# Patient Record
Sex: FEMALE | Race: WHITE | NOT HISPANIC OR LATINO | Employment: FULL TIME | URBAN - METROPOLITAN AREA
[De-identification: names, ages, dates, MRNs, and addresses within clinical notes are randomized per-mention and may not be internally consistent; named-entity substitution may affect disease eponyms.]

---

## 2023-04-10 ENCOUNTER — APPOINTMENT (OUTPATIENT)
Dept: LAB | Facility: LAB | Age: 63
End: 2023-04-10
Payer: COMMERCIAL

## 2023-04-10 LAB
BASOPHILS (10*3/UL) IN BLOOD BY AUTOMATED COUNT: 0.04 X10E9/L (ref 0–0.1)
BASOPHILS/100 LEUKOCYTES IN BLOOD BY AUTOMATED COUNT: 0.6 % (ref 0–2)
EOSINOPHILS (10*3/UL) IN BLOOD BY AUTOMATED COUNT: 0.16 X10E9/L (ref 0–0.7)
EOSINOPHILS/100 LEUKOCYTES IN BLOOD BY AUTOMATED COUNT: 2.3 % (ref 0–6)
ERYTHROCYTE DISTRIBUTION WIDTH (RATIO) BY AUTOMATED COUNT: 12.3 % (ref 11.5–14.5)
ERYTHROCYTE MEAN CORPUSCULAR HEMOGLOBIN CONCENTRATION (G/DL) BY AUTOMATED: 32.1 G/DL (ref 32–36)
ERYTHROCYTE MEAN CORPUSCULAR VOLUME (FL) BY AUTOMATED COUNT: 87 FL (ref 80–100)
ERYTHROCYTES (10*6/UL) IN BLOOD BY AUTOMATED COUNT: 4.83 X10E12/L (ref 4–5.2)
HEMATOCRIT (%) IN BLOOD BY AUTOMATED COUNT: 42.1 % (ref 36–46)
HEMOGLOBIN (G/DL) IN BLOOD: 13.5 G/DL (ref 12–16)
IMMATURE GRANULOCYTES/100 LEUKOCYTES IN BLOOD BY AUTOMATED COUNT: 0.6 % (ref 0–0.9)
LEUKOCYTES (10*3/UL) IN BLOOD BY AUTOMATED COUNT: 6.8 X10E9/L (ref 4.4–11.3)
LYMPHOCYTES (10*3/UL) IN BLOOD BY AUTOMATED COUNT: 2.41 X10E9/L (ref 1.2–4.8)
LYMPHOCYTES/100 LEUKOCYTES IN BLOOD BY AUTOMATED COUNT: 35.3 % (ref 13–44)
MONOCYTES (10*3/UL) IN BLOOD BY AUTOMATED COUNT: 0.71 X10E9/L (ref 0.1–1)
MONOCYTES/100 LEUKOCYTES IN BLOOD BY AUTOMATED COUNT: 10.4 % (ref 2–10)
NEUTROPHILS (10*3/UL) IN BLOOD BY AUTOMATED COUNT: 3.46 X10E9/L (ref 1.2–7.7)
NEUTROPHILS/100 LEUKOCYTES IN BLOOD BY AUTOMATED COUNT: 50.8 % (ref 40–80)
NRBC (PER 100 WBCS) BY AUTOMATED COUNT: 0 /100 WBC (ref 0–0)
PLATELETS (10*3/UL) IN BLOOD AUTOMATED COUNT: 241 X10E9/L (ref 150–450)

## 2023-04-11 LAB
ALLERGEN ANIMAL: CAT DANDER IGE (KU/L): 0.13 KU/L
ALLERGEN ANIMAL: DOG DANDER IGE (KU/L): 0.13 KU/L
ALLERGEN GRASS: BERMUDA GRASS (CYNODON DACTYLON) IGE (KU/L): 1.29 KU/L
ALLERGEN GRASS: JOHNSON GRASS (SORGHUM HALEPENSE) IGE (KU/L): 0.73 KU/L
ALLERGEN GRASS: MEADOW GRASS, KENTUCKY BLUE (POA PRATENSIS )IGE (KU/L): 2.98 KU/L
ALLERGEN GRASS: TIMOTHY GRASS (PHLEUM PRATENSE) IGE (KU/L): 2.29 KU/L
ALLERGEN INSECT: COCKROACH IGE: <0.1 KU/L
ALLERGEN MICROORGANISM: ALTERNARIA ALTERNATA IGE (KU/L): <0.1 KU/L
ALLERGEN MICROORGANISM: ASPERGILLUS FUMIGATUS IGE (KU/L): <0.1 KU/L
ALLERGEN MICROORGANISM: CLADOSPORIUM HERBARUM IGE (KU/L): <0.1 KU/L
ALLERGEN MICROORGANISM: PENICILLIUM CHRYSOGENUM (P. NOTATUM) IGE (KU/L): <0.1 KU/L
ALLERGEN MITE: DERMATOPHAGOIDES FARINAE (HOUSE DUST MITE) IGE (KU/L): 0.7 KU/L
ALLERGEN MITE: DERMATOPHAGOIDES PTERONYSSINUS (HOUSE DUST MITE) IGE (KU/L): 0.16 KU/L
ALLERGEN TREE: BOX-ELDER (ACER NEGUNDO) IGE (KU/L): <0.1 KU/L
ALLERGEN TREE: COMMON SILVER BIRCH (BETULA VERRUCOSA) IGE (KU/L): <0.1 KU/L
ALLERGEN TREE: COTTONWOOD (POPULUS DELTOIDES) IGE (KU/L): <0.1 KU/L
ALLERGEN TREE: ELM (ULMUS AMERICANA) IGE (KU/L): <0.1 KU/L
ALLERGEN TREE: MAPLE LEAF SYCAMORE, LONDON PLANE IGE (KU/L): <0.1 KU/L
ALLERGEN TREE: MOUNTAIN JUNIPER (JUNIPERUS SABINOIDES) IGE (KU/L): <0.1 KU/L
ALLERGEN TREE: MULBERRY (MORUS ALBA) IGE (KU/L): <0.1 KU/L
ALLERGEN TREE: OAK (QUERCUS ALBA) IGE (KU/L): <0.1 KU/L
ALLERGEN TREE: PECAN, HICKORY (CARYA PECAN) IGE (KU/L): <0.1 KU/L
ALLERGEN TREE: WALNUT IGE: <0.1 KU/L
ALLERGEN TREE: WHITE ASH (FRAXINUS AMERICANA) IGE (KU/L): <0.1 KU/L
ALLERGEN WEED: COMMON PIGWEED (AMARANTHUS RETROFLEXUS) IGE (KU/L): <0.1 KU/L
ALLERGEN WEED: COMMON RAGWEED (AMB. ARTEMISIIFOLIA/A. ELATIOR) IGE (KU/L): 0.54 KU/L
ALLERGEN WEED: GOOSEFOOT, LAMB'S QUARTERS (CHENOPODIUM ALBUM) IGE (KU/L): <0.1 KU/L
ALLERGEN WEED: PLANTAIN (ENGLISH), RIBWORT (PLANTAGO LANCEOLATA) IGE (KU/L): <0.1 KU/L
ALLERGEN WEED: PRICKLY SALTWORT/RUSSIAN THISTLE (SALSOLA KALI) IGE (KU/L): <0.1 KU/L
ALLERGEN WEED: SHEEP SORREL (RUMEX ACETOSELLA) IGE (KU/L): <0.1 KU/L
IMMUNOCAP IGE: 121 KU/L (ref 0–214)
IMMUNOCAP INTERPRETATION: ABNORMAL

## 2023-04-14 LAB
CORTISOL UR FREE PER VOLUME: 6.21 UG/L
CORTISOL, URINE FREE - PER 24H: 7.6 UG/D
CORTISOL, URINE INTERPRETATION: NORMAL
CORTISOL/CREATININE RATIO: 6.54 UG/G CRT
CREATININE 24 HOUR URINE: 1167 MG/D (ref 500–1400)
CREATININE, URINE - PER VOLUME: 95 MG/DL
HOURS COLLECTED (ARUP): 24
TOTAL VOLUME (ARUP): 1228

## 2023-05-30 ENCOUNTER — APPOINTMENT (OUTPATIENT)
Dept: LAB | Facility: LAB | Age: 63
End: 2023-05-30
Payer: COMMERCIAL

## 2023-06-29 LAB
GENETICS TEST NAME-DATA CONVERSION: NORMAL
LAB MOLECULAR CA TECHNICAL NOTES: NORMAL

## 2023-10-10 ENCOUNTER — PHARMACY VISIT (OUTPATIENT)
Dept: PHARMACY | Facility: CLINIC | Age: 63
End: 2023-10-10
Payer: COMMERCIAL

## 2023-10-10 RX ORDER — ALBUTEROL SULFATE 90 UG/1
AEROSOL, METERED RESPIRATORY (INHALATION)
Qty: 18 G | Refills: 0 | OUTPATIENT
Start: 2023-01-04

## 2023-10-10 RX ORDER — FLUTICASONE FUROATE AND VILANTEROL 200; 25 UG/1; UG/1
POWDER RESPIRATORY (INHALATION)
Qty: 60 EACH | Refills: 1 | OUTPATIENT
Start: 2023-03-14

## 2023-10-14 ENCOUNTER — PHARMACY VISIT (OUTPATIENT)
Dept: PHARMACY | Facility: CLINIC | Age: 63
End: 2023-10-14
Payer: COMMERCIAL

## 2023-10-17 ENCOUNTER — PHARMACY VISIT (OUTPATIENT)
Dept: PHARMACY | Facility: CLINIC | Age: 63
End: 2023-10-17
Payer: COMMERCIAL

## 2023-10-17 ENCOUNTER — TELEPHONE (OUTPATIENT)
Dept: INTENSIVE CARE | Facility: HOSPITAL | Age: 63
End: 2023-10-17
Payer: COMMERCIAL

## 2023-10-17 DIAGNOSIS — J45.20 MILD INTERMITTENT ASTHMA, UNSPECIFIED WHETHER COMPLICATED (HHS-HCC): Primary | ICD-10-CM

## 2023-10-17 PROCEDURE — RXMED WILLOW AMBULATORY MEDICATION CHARGE

## 2023-10-17 RX ORDER — MONTELUKAST SODIUM 10 MG/1
10 TABLET ORAL NIGHTLY
Qty: 30 TABLET | Refills: 5 | Status: SHIPPED | OUTPATIENT
Start: 2023-10-17 | End: 2024-04-14

## 2023-10-18 ENCOUNTER — PHARMACY VISIT (OUTPATIENT)
Dept: PHARMACY | Facility: CLINIC | Age: 63
End: 2023-10-18
Payer: COMMERCIAL

## 2023-10-25 ENCOUNTER — HOSPITAL ENCOUNTER (OUTPATIENT)
Dept: RADIOLOGY | Facility: HOSPITAL | Age: 63
Discharge: HOME | End: 2023-10-25
Payer: COMMERCIAL

## 2023-10-25 DIAGNOSIS — Z85.3 PERSONAL HISTORY OF MALIGNANT NEOPLASM OF BREAST: ICD-10-CM

## 2023-10-25 PROCEDURE — 77067 SCR MAMMO BI INCL CAD: CPT

## 2023-10-25 PROCEDURE — 77063 BREAST TOMOSYNTHESIS BI: CPT | Mod: BILATERAL PROCEDURE | Performed by: STUDENT IN AN ORGANIZED HEALTH CARE EDUCATION/TRAINING PROGRAM

## 2023-10-25 PROCEDURE — 77063 BREAST TOMOSYNTHESIS BI: CPT

## 2023-10-25 PROCEDURE — 77067 SCR MAMMO BI INCL CAD: CPT | Mod: BILATERAL PROCEDURE | Performed by: STUDENT IN AN ORGANIZED HEALTH CARE EDUCATION/TRAINING PROGRAM

## 2023-11-06 ENCOUNTER — HOSPITAL ENCOUNTER (OUTPATIENT)
Dept: RADIOLOGY | Facility: HOSPITAL | Age: 63
Discharge: HOME | End: 2023-11-06
Payer: COMMERCIAL

## 2023-11-06 DIAGNOSIS — C7A.8 OTHER MALIGNANT NEUROENDOCRINE TUMORS (MULTI): ICD-10-CM

## 2023-11-06 PROCEDURE — 71250 CT THORAX DX C-: CPT | Performed by: RADIOLOGY

## 2023-11-06 PROCEDURE — 71250 CT THORAX DX C-: CPT

## 2023-11-08 ENCOUNTER — OFFICE VISIT (OUTPATIENT)
Dept: HEMATOLOGY/ONCOLOGY | Facility: HOSPITAL | Age: 63
End: 2023-11-08
Payer: COMMERCIAL

## 2023-11-08 ENCOUNTER — LAB (OUTPATIENT)
Dept: LAB | Facility: HOSPITAL | Age: 63
End: 2023-11-08
Payer: COMMERCIAL

## 2023-11-08 VITALS
RESPIRATION RATE: 18 BRPM | SYSTOLIC BLOOD PRESSURE: 150 MMHG | DIASTOLIC BLOOD PRESSURE: 80 MMHG | HEART RATE: 94 BPM | TEMPERATURE: 97.3 F | BODY MASS INDEX: 39.14 KG/M2 | OXYGEN SATURATION: 97 % | WEIGHT: 200.4 LBS

## 2023-11-08 DIAGNOSIS — D3A.8 NEUROENDOCRINE NEOPLASM OF LUNG (CMS-HCC): Primary | ICD-10-CM

## 2023-11-08 DIAGNOSIS — J15.9 COMMUNITY ACQUIRED BACTERIAL PNEUMONIA: Primary | ICD-10-CM

## 2023-11-08 DIAGNOSIS — J15.9 COMMUNITY ACQUIRED BACTERIAL PNEUMONIA: ICD-10-CM

## 2023-11-08 PROBLEM — M81.0 POSTMENOPAUSAL OSTEOPOROSIS: Status: ACTIVE | Noted: 2023-11-08

## 2023-11-08 PROBLEM — M17.11 PRIMARY LOCALIZED OSTEOARTHROSIS OF RIGHT LOWER LEG: Status: ACTIVE | Noted: 2023-11-08

## 2023-11-08 PROBLEM — J39.2 OROPHARYNGEAL LESION: Status: ACTIVE | Noted: 2023-11-08

## 2023-11-08 PROBLEM — H02.883 MEIBOMIAN GLAND DYSFUNCTION (MGD) OF RIGHT EYE: Status: ACTIVE | Noted: 2023-11-08

## 2023-11-08 PROBLEM — H52.4 PRESBYOPIA OF BOTH EYES: Status: ACTIVE | Noted: 2023-11-08

## 2023-11-08 PROBLEM — R29.810 FACIAL DROOP: Status: ACTIVE | Noted: 2023-11-08

## 2023-11-08 PROBLEM — C50.919 BREAST CANCER (MULTI): Status: ACTIVE | Noted: 2023-11-08

## 2023-11-08 PROBLEM — K63.89 COLONIC MASS: Status: ACTIVE | Noted: 2023-11-08

## 2023-11-08 PROBLEM — H02.886 MEIBOMIAN GLAND DYSFUNCTION (MGD) OF LEFT EYE: Status: ACTIVE | Noted: 2023-11-08

## 2023-11-08 PROBLEM — J40 BRONCHITIS: Status: ACTIVE | Noted: 2023-11-08

## 2023-11-08 PROBLEM — H43.812 PVD (POSTERIOR VITREOUS DETACHMENT), LEFT EYE: Status: ACTIVE | Noted: 2023-11-08

## 2023-11-08 PROBLEM — S52.123A RADIAL HEAD FRACTURE: Status: ACTIVE | Noted: 2023-11-08

## 2023-11-08 PROBLEM — R91.8 MULTIPLE LUNG NODULES ON CT: Status: ACTIVE | Noted: 2023-11-08

## 2023-11-08 PROBLEM — H25.13 CATARACT, NUCLEAR SCLEROTIC, BOTH EYES: Status: ACTIVE | Noted: 2023-11-08

## 2023-11-08 PROBLEM — F41.9 ANXIETY: Status: ACTIVE | Noted: 2023-11-08

## 2023-11-08 PROBLEM — H52.10 MYOPIA WITH PRESBYOPIA: Status: ACTIVE | Noted: 2023-11-08

## 2023-11-08 PROBLEM — M25.559 ARTHRALGIA OF HIP: Status: ACTIVE | Noted: 2023-11-08

## 2023-11-08 PROBLEM — G51.0 BELL'S PALSY: Status: ACTIVE | Noted: 2023-11-08

## 2023-11-08 PROBLEM — H52.4 MYOPIA WITH PRESBYOPIA: Status: ACTIVE | Noted: 2023-11-08

## 2023-11-08 PROBLEM — K21.9 REFLUX LARYNGITIS: Status: ACTIVE | Noted: 2023-11-08

## 2023-11-08 PROBLEM — N95.9 PREMENOPAUSAL PATIENT: Status: ACTIVE | Noted: 2023-11-08

## 2023-11-08 PROBLEM — T81.41XA POSTOPERATIVE STITCH ABSCESS: Status: ACTIVE | Noted: 2023-11-08

## 2023-11-08 PROBLEM — N93.9 ABNORMAL UTERINE BLEEDING (AUB): Status: ACTIVE | Noted: 2023-11-08

## 2023-11-08 PROBLEM — L30.9 DERMATITIS, ECZEMATOID: Status: ACTIVE | Noted: 2023-11-08

## 2023-11-08 PROBLEM — E53.8 VITAMIN B12 DEFICIENCY: Status: ACTIVE | Noted: 2023-11-08

## 2023-11-08 PROBLEM — R79.89 ABNORMAL LFTS (LIVER FUNCTION TESTS): Status: ACTIVE | Noted: 2023-11-08

## 2023-11-08 PROBLEM — N95.0 POSTMENOPAUSAL BLEEDING: Status: ACTIVE | Noted: 2023-11-08

## 2023-11-08 PROBLEM — M23.303 DERANGEMENT OF MEDIAL MENISCUS OF RIGHT KNEE: Status: ACTIVE | Noted: 2023-11-08

## 2023-11-08 PROBLEM — H04.123 BILATERAL DRY EYES: Status: ACTIVE | Noted: 2023-11-08

## 2023-11-08 PROBLEM — M23.302: Status: ACTIVE | Noted: 2023-11-08

## 2023-11-08 PROBLEM — R19.5 POSITIVE OCCULT STOOL BLOOD TEST: Status: ACTIVE | Noted: 2023-11-08

## 2023-11-08 PROBLEM — J04.0 REFLUX LARYNGITIS: Status: ACTIVE | Noted: 2023-11-08

## 2023-11-08 PROBLEM — E78.1 FAMILIAL HYPERTRIGLYCERIDEMIA: Status: ACTIVE | Noted: 2023-11-08

## 2023-11-08 PROBLEM — I10 HYPERTENSION: Status: ACTIVE | Noted: 2023-11-08

## 2023-11-08 PROBLEM — R94.8 ABNORMAL POSITRON EMISSION TOMOGRAPHY (PET) SCAN: Status: ACTIVE | Noted: 2023-11-08

## 2023-11-08 PROBLEM — H52.203 ASTIGMATISM OF BOTH EYES: Status: ACTIVE | Noted: 2023-11-08

## 2023-11-08 PROBLEM — R79.89 HIGH SERUM LOW-DENSITY LIPOPROTEIN (LDL): Status: ACTIVE | Noted: 2023-11-08

## 2023-11-08 PROBLEM — M23.300 DERANGEMENT OF LATERAL MENISCUS OF RIGHT KNEE: Status: ACTIVE | Noted: 2023-11-08

## 2023-11-08 PROBLEM — H52.13 BILATERAL MYOPIA: Status: ACTIVE | Noted: 2023-11-08

## 2023-11-08 PROBLEM — U07.1 COVID-19 VIRUS DETECTED: Status: ACTIVE | Noted: 2023-11-08

## 2023-11-08 PROBLEM — R91.1 LUNG NODULE: Status: ACTIVE | Noted: 2023-11-08

## 2023-11-08 PROBLEM — M17.11 PRIMARY OSTEOARTHRITIS OF RIGHT KNEE: Status: ACTIVE | Noted: 2023-11-08

## 2023-11-08 PROCEDURE — 3079F DIAST BP 80-89 MM HG: CPT | Performed by: INTERNAL MEDICINE

## 2023-11-08 PROCEDURE — 99215 OFFICE O/P EST HI 40 MIN: CPT | Performed by: INTERNAL MEDICINE

## 2023-11-08 PROCEDURE — 87205 SMEAR GRAM STAIN: CPT

## 2023-11-08 PROCEDURE — 3077F SYST BP >= 140 MM HG: CPT | Performed by: INTERNAL MEDICINE

## 2023-11-08 PROCEDURE — 1036F TOBACCO NON-USER: CPT | Performed by: INTERNAL MEDICINE

## 2023-11-08 RX ORDER — LOSARTAN POTASSIUM AND HYDROCHLOROTHIAZIDE 12.5; 1 MG/1; MG/1
1 TABLET ORAL DAILY
COMMUNITY
Start: 2021-09-02 | End: 2024-05-15 | Stop reason: SDUPTHER

## 2023-11-08 RX ORDER — LEVOFLOXACIN 750 MG/1
750 TABLET ORAL DAILY
Qty: 14 TABLET | Refills: 0 | Status: SHIPPED | OUTPATIENT
Start: 2023-11-08 | End: 2023-11-22

## 2023-11-08 RX ORDER — PHENYLPROPANOLAMINE/CLEMASTINE 75-1.34MG
TABLET, EXTENDED RELEASE ORAL
COMMUNITY

## 2023-11-08 RX ORDER — TRIAMTERENE/HYDROCHLOROTHIAZID 37.5-25 MG
0.5 TABLET ORAL
COMMUNITY
Start: 2012-11-30 | End: 2024-05-15 | Stop reason: ALTCHOICE

## 2023-11-08 RX ORDER — DOCUSATE SODIUM 100 MG/1
100 CAPSULE, LIQUID FILLED ORAL 2 TIMES DAILY
COMMUNITY
Start: 2009-02-24 | End: 2024-05-15 | Stop reason: ALTCHOICE

## 2023-11-08 RX ORDER — ACETAMINOPHEN 500 MG
TABLET ORAL
COMMUNITY
End: 2024-05-15 | Stop reason: ALTCHOICE

## 2023-11-08 RX ORDER — CHOLECALCIFEROL (VITAMIN D3) 125 MCG
CAPSULE ORAL
COMMUNITY
End: 2024-05-15 | Stop reason: ALTCHOICE

## 2023-11-08 RX ORDER — LOSARTAN POTASSIUM 100 MG/1
1 TABLET ORAL DAILY
COMMUNITY
End: 2024-05-15 | Stop reason: ALTCHOICE

## 2023-11-08 RX ORDER — ACETAMINOPHEN 500 MG
TABLET ORAL
COMMUNITY

## 2023-11-08 RX ORDER — DICLOFENAC SODIUM 10 MG/G
GEL TOPICAL
COMMUNITY

## 2023-11-08 RX ORDER — CYANOCOBALAMIN 1000 UG/ML
1000 INJECTION, SOLUTION INTRAMUSCULAR; SUBCUTANEOUS
COMMUNITY
Start: 2010-03-04 | End: 2024-05-15 | Stop reason: ALTCHOICE

## 2023-11-08 RX ORDER — HYDROCHLOROTHIAZIDE 12.5 MG/1
CAPSULE ORAL
COMMUNITY
End: 2024-05-15 | Stop reason: ALTCHOICE

## 2023-11-08 RX ORDER — ERGOCALCIFEROL 1.25 MG/1
1 CAPSULE ORAL
COMMUNITY
Start: 2010-03-04 | End: 2024-05-15 | Stop reason: ALTCHOICE

## 2023-11-08 RX ORDER — TAMOXIFEN CITRATE 20 MG/1
20 TABLET ORAL
COMMUNITY
Start: 2009-02-19 | End: 2024-05-15 | Stop reason: ALTCHOICE

## 2023-11-08 RX ORDER — MINERAL OIL
ENEMA (ML) RECTAL
COMMUNITY
End: 2024-05-15 | Stop reason: ALTCHOICE

## 2023-11-08 RX ORDER — OMEPRAZOLE 20 MG/1
CAPSULE, DELAYED RELEASE ORAL
COMMUNITY
End: 2024-05-15 | Stop reason: ALTCHOICE

## 2023-11-08 RX ORDER — TRIAMCINOLONE ACETONIDE 55 UG/1
2 SPRAY, METERED NASAL DAILY
COMMUNITY

## 2023-11-08 RX ORDER — OMEPRAZOLE 40 MG/1
CAPSULE, DELAYED RELEASE ORAL
COMMUNITY
Start: 2022-05-25

## 2023-11-08 ASSESSMENT — PATIENT HEALTH QUESTIONNAIRE - PHQ9
SUM OF ALL RESPONSES TO PHQ9 QUESTIONS 1 AND 2: 0
2. FEELING DOWN, DEPRESSED OR HOPELESS: NOT AT ALL
1. LITTLE INTEREST OR PLEASURE IN DOING THINGS: NOT AT ALL

## 2023-11-08 ASSESSMENT — COLUMBIA-SUICIDE SEVERITY RATING SCALE - C-SSRS
1. IN THE PAST MONTH, HAVE YOU WISHED YOU WERE DEAD OR WISHED YOU COULD GO TO SLEEP AND NOT WAKE UP?: NO
6. HAVE YOU EVER DONE ANYTHING, STARTED TO DO ANYTHING, OR PREPARED TO DO ANYTHING TO END YOUR LIFE?: NO
2. HAVE YOU ACTUALLY HAD ANY THOUGHTS OF KILLING YOURSELF?: NO

## 2023-11-08 ASSESSMENT — PAIN SCALES - GENERAL: PAINLEVEL: 0-NO PAIN

## 2023-11-08 NOTE — PROGRESS NOTES
Patient ID: Geena Sánchez is a 63 y.o. female.    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  Date of diagnosis is January 31, 2023 from a transbronchial biopsy of the lingula.  Immunohistochemistry positive for INSM1, synaptophysin, chromogranin and TTF-1  and negative for p40 GATA3 and ER.  Ki-67 is 10%.  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 - LOF  TMB 2.6 m/MB  MSI stable        SERUM TUMOR MARKER     Not yet performed        PRIOR THERAPY     1-none        CURRENT THERAPY     1-Patient has opted for observation April 2023  2- Given positive PET/NET imaging consideration to treatment with frontline octreotide in future  3-consideration for SBRT lesion to solitary growing lesion in the future        CURRENT ONCOLOGICAL PROBLEMS     1-chronic cough and asthma  2-  Rule out hereditary syndromes given multiple cancers within the family around the same age - NGS shows APC alterations, seen by Genetics Medicine, no familial syndrome identified  3- Pneumonia Nov 2023       HISTORY OF PRESENT ILLNESS     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  She states that she has been coughing for many years and almost decades.  This gets worse during the fall and winter.  She has used inhalers but nothing seems to have worked in the past.  It affects her sleeping.   Most recently she was started on Singulair and it has worked.  She also notes a history of pneumonia 1997.  Somewhere around 2022 her cough got worse, there was no flushing no diarrhea she does have some mild stress  incontinence with her cough.  In January 2022 she had a chest x-ray showing some nodules.  In March 2022 she had a CT scan showing bilateral pulmonary nodules up to slightly greater than 1 cm.  She had a PET scan on March 18, 2022 showing only mild uptake.  She had a repeat CT scan in May 2022  showing stability of disease as well as an September 2022.  But by December 2020 to one of the lesions had slightly grown and it was at that time and decided to proceed with a bronchoscopy that was performed on January 31, 2023 showing a well differentiated  neuroendocrine tumor.        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  She lives in [place] with her sister and brother and mother.  Her mother has dementia.  She has never smoked.  She is not .  She does not have children.        CURRENT MEDS     See medication list, meds reviewed        ALLERGIES     Lisinopril and pollen        FAMILY HISTORY     Patient's herself had breast cancer in the past at the age of 48.  Her mother had breast cancer at the age of 61 her father had not Hodgkin's lymphoma at the age of 62.       Subjective    Cancer  Associated symptoms include coughing. Pertinent negatives include no abdominal pain, anorexia, arthralgias, change in bowel habit, chest pain, chills, congestion, diaphoresis, fatigue, fever, headaches, joint swelling, myalgias, nausea, neck pain, numbness, rash, sore throat, swollen glands, urinary symptoms, vertigo, visual change, vomiting or weakness.     Patient has noticed increasing cough over the past 2 to 3 weeks.  She has not been on antibiotics.  Has  been using her inhalers somewhat more.  Her phlegm is for the most part clear but has recently been slightly yellow.  This has been going on for about 3 weeks.  No nausea no vomiting.  Appetite remains good bowel movements regular no bright red blood per rectum no melena no chest pain.      Objective    BSA: 1.96 meters squared  /80 (BP Location: Left arm, Patient Position: Standing)   Pulse 94   Temp 36.3 °C (97.3 °F)   Resp 18   Wt 90.9 kg (200 lb 6.4 oz)   SpO2 97%   BMI 39.14 kg/m²      Physical Exam  Constitutional:       Appearance: Normal appearance. She is normal weight.   HENT:      Mouth/Throat:      Mouth: Mucous membranes are moist.      Pharynx: Oropharynx is clear.   Eyes:      Conjunctiva/sclera: Conjunctivae normal.      Pupils: Pupils are equal, round, and reactive to light.   Cardiovascular:      Rate and Rhythm: Normal rate and regular rhythm.      Pulses: Normal pulses.      Heart sounds: Normal heart sounds.   Pulmonary:      Effort: Pulmonary effort is normal.      Comments: Bilateral rhonchi predominating right lower lobe  Abdominal:      General: Abdomen is flat. Bowel sounds are normal.      Palpations: Abdomen is soft.   Skin:     General: Skin is warm.   Neurological:      General: No focal deficit present.      Mental Status: She is alert and oriented to person, place, and time. Mental status is at baseline.   Psychiatric:         Mood and Affect: Mood normal.         Behavior: Behavior normal.         Performance Status:  Symptomatic; fully ambulatory    CT Chest 11/6/2023  IMPRESSION:  1. Interval development of patchy and consolidative opacities, as  well as tree-in-bud nodularity, throughout the right lower lobe,  likely representing pneumonia. Clinical correlation recommended for  further evaluation.  2. Overall similar size and appearance of innumerable pulmonary  nodules throughout the bilateral lungs. No new discrete pulmonary  nodules identified.  3. Stable  postsurgical changes of the right breast, as detailed  above, better characterized on recent mammography.  4. Additional chronic findings as above.      Assessment/Plan     This is a very nice 63-year-old patient with well differentiated neuroendocrine tumor of the lung with bilateral lung involvement.  Her imaging that initially demonstrated nodules dates back to March 2022.  When comparing her most recent CT scan of the chest from November 6, 2023 I see no change in size of any of these nodules over the past now close to 2 years.  Based on the stability we will continue observation.  In addition there is no evidence of hormonal secretion of her neuroendocrine tumor.  We plan for repeat imaging in 6 months.    On her history, examination and imaging today, however it seems she has a right lower lobe pneumonia.  I discussed this with her pulmonologist Dr. Cortés who agrees and antibiotics have been prescribed to her.  We will check on her next week to see how she is doing.      Cancer Staging   No matching staging information was found for the patient.    Oncology History    No history exists.                 Adams Amanda MD

## 2023-11-08 NOTE — PROGRESS NOTES
CT of chest is abnormal with new consolidation  in the right lower lobe    Spoke to Geena. She has a change in cough, now increased, increased sputum that is changing color from clear to tan  No fever, chills or night sweats  Had a recent course of azithromycin    IMPRESSION  Bacterial pneumonia suspected -     MANAGEMENT  Start Levofloxacin 750 mg daily  Check sputum culture today

## 2023-11-09 LAB
BACTERIA SPEC RESP CULT: ABNORMAL
GRAM STN SPEC: ABNORMAL

## 2023-11-12 ASSESSMENT — ENCOUNTER SYMPTOMS
MYALGIAS: 0
COUGH: 1
ABDOMINAL PAIN: 0
NAUSEA: 0
SORE THROAT: 0
FATIGUE: 0
CHILLS: 0
JOINT SWELLING: 0
ANOREXIA: 0
VERTIGO: 0
NUMBNESS: 0
VOMITING: 0
VISUAL CHANGE: 0
DIAPHORESIS: 0
HEADACHES: 0
FEVER: 0
WEAKNESS: 0
ARTHRALGIAS: 0
SWOLLEN GLANDS: 0
NECK PAIN: 0
CHANGE IN BOWEL HABIT: 0

## 2023-11-13 ENCOUNTER — PHARMACY VISIT (OUTPATIENT)
Dept: PHARMACY | Facility: CLINIC | Age: 63
End: 2023-11-13
Payer: COMMERCIAL

## 2023-11-13 PROCEDURE — RXMED WILLOW AMBULATORY MEDICATION CHARGE

## 2023-11-15 ENCOUNTER — HOSPITAL ENCOUNTER (OUTPATIENT)
Dept: RADIOLOGY | Facility: HOSPITAL | Age: 63
Discharge: HOME | End: 2023-11-15
Payer: COMMERCIAL

## 2023-11-15 ENCOUNTER — LAB (OUTPATIENT)
Dept: LAB | Facility: LAB | Age: 63
End: 2023-11-15
Payer: COMMERCIAL

## 2023-11-15 ENCOUNTER — TELEPHONE (OUTPATIENT)
Dept: PULMONOLOGY | Facility: HOSPITAL | Age: 63
End: 2023-11-15

## 2023-11-15 DIAGNOSIS — J15.9 COMMUNITY ACQUIRED BACTERIAL PNEUMONIA: ICD-10-CM

## 2023-11-15 DIAGNOSIS — J18.9 PNEUMONIA DUE TO INFECTIOUS ORGANISM, UNSPECIFIED LATERALITY, UNSPECIFIED PART OF LUNG: ICD-10-CM

## 2023-11-15 PROCEDURE — 87075 CULTR BACTERIA EXCEPT BLOOD: CPT

## 2023-11-15 PROCEDURE — 71046 X-RAY EXAM CHEST 2 VIEWS: CPT

## 2023-11-15 PROCEDURE — 71046 X-RAY EXAM CHEST 2 VIEWS: CPT | Performed by: RADIOLOGY

## 2023-11-15 PROCEDURE — 87070 CULTURE OTHR SPECIMN AEROBIC: CPT

## 2023-11-15 PROCEDURE — 87205 SMEAR GRAM STAIN: CPT

## 2023-11-15 NOTE — TELEPHONE ENCOUNTER
Tried to call pt at [phone] regarding new sputum culture and CXR wanted by Dr. Cortés, but was unsuccessful. Voicemail message left with instructions to return the call at [phone]. Order placed for sputum culture and CXR.

## 2023-11-17 LAB
BACTERIA SPEC RESP CULT: NORMAL
GRAM STN SPEC: NORMAL
GRAM STN SPEC: NORMAL

## 2024-05-01 ENCOUNTER — HOSPITAL ENCOUNTER (OUTPATIENT)
Dept: RADIOLOGY | Facility: HOSPITAL | Age: 64
Discharge: HOME | End: 2024-05-01
Payer: COMMERCIAL

## 2024-05-01 DIAGNOSIS — D3A.8 NEUROENDOCRINE NEOPLASM OF LUNG (CMS-HCC): ICD-10-CM

## 2024-05-01 PROCEDURE — 71250 CT THORAX DX C-: CPT

## 2024-05-01 PROCEDURE — 71250 CT THORAX DX C-: CPT | Performed by: STUDENT IN AN ORGANIZED HEALTH CARE EDUCATION/TRAINING PROGRAM

## 2024-05-06 ENCOUNTER — TELEPHONE (OUTPATIENT)
Dept: PRIMARY CARE | Facility: CLINIC | Age: 64
End: 2024-05-06
Payer: COMMERCIAL

## 2024-05-06 ENCOUNTER — OFFICE VISIT (OUTPATIENT)
Dept: HEMATOLOGY/ONCOLOGY | Facility: HOSPITAL | Age: 64
End: 2024-05-06
Payer: COMMERCIAL

## 2024-05-06 VITALS
RESPIRATION RATE: 20 BRPM | WEIGHT: 195.99 LBS | SYSTOLIC BLOOD PRESSURE: 148 MMHG | HEART RATE: 71 BPM | OXYGEN SATURATION: 97 % | BODY MASS INDEX: 38.28 KG/M2 | DIASTOLIC BLOOD PRESSURE: 71 MMHG | TEMPERATURE: 97.3 F

## 2024-05-06 DIAGNOSIS — D3A.8 NEUROENDOCRINE NEOPLASM OF LUNG (CMS-HCC): ICD-10-CM

## 2024-05-06 DIAGNOSIS — Z00.00 HEALTHCARE MAINTENANCE: Primary | ICD-10-CM

## 2024-05-06 PROCEDURE — 3077F SYST BP >= 140 MM HG: CPT | Performed by: INTERNAL MEDICINE

## 2024-05-06 PROCEDURE — 99213 OFFICE O/P EST LOW 20 MIN: CPT | Performed by: INTERNAL MEDICINE

## 2024-05-06 PROCEDURE — 3078F DIAST BP <80 MM HG: CPT | Performed by: INTERNAL MEDICINE

## 2024-05-06 ASSESSMENT — PAIN SCALES - GENERAL: PAINLEVEL: 0-NO PAIN

## 2024-05-13 ENCOUNTER — LAB (OUTPATIENT)
Dept: LAB | Facility: LAB | Age: 64
End: 2024-05-13
Payer: COMMERCIAL

## 2024-05-13 DIAGNOSIS — Z00.00 HEALTHCARE MAINTENANCE: ICD-10-CM

## 2024-05-13 LAB
ALBUMIN SERPL BCP-MCNC: 4.3 G/DL (ref 3.4–5)
ALP SERPL-CCNC: 64 U/L (ref 33–136)
ALT SERPL W P-5'-P-CCNC: 19 U/L (ref 7–45)
ANION GAP SERPL CALC-SCNC: 15 MMOL/L (ref 10–20)
AST SERPL W P-5'-P-CCNC: 18 U/L (ref 9–39)
BASOPHILS # BLD AUTO: 0.04 X10*3/UL (ref 0–0.1)
BASOPHILS NFR BLD AUTO: 0.8 %
BILIRUB SERPL-MCNC: 0.7 MG/DL (ref 0–1.2)
BUN SERPL-MCNC: 15 MG/DL (ref 6–23)
CALCIUM SERPL-MCNC: 9.2 MG/DL (ref 8.6–10.6)
CHLORIDE SERPL-SCNC: 102 MMOL/L (ref 98–107)
CHOLEST SERPL-MCNC: 205 MG/DL (ref 0–199)
CHOLESTEROL/HDL RATIO: 4
CO2 SERPL-SCNC: 26 MMOL/L (ref 21–32)
CREAT SERPL-MCNC: 0.67 MG/DL (ref 0.5–1.05)
EGFRCR SERPLBLD CKD-EPI 2021: >90 ML/MIN/1.73M*2
EOSINOPHIL # BLD AUTO: 0.17 X10*3/UL (ref 0–0.7)
EOSINOPHIL NFR BLD AUTO: 3.2 %
ERYTHROCYTE [DISTWIDTH] IN BLOOD BY AUTOMATED COUNT: 12.2 % (ref 11.5–14.5)
GLUCOSE SERPL-MCNC: 92 MG/DL (ref 74–99)
HCT VFR BLD AUTO: 41.9 % (ref 36–46)
HDLC SERPL-MCNC: 51.8 MG/DL
HGB BLD-MCNC: 13.7 G/DL (ref 12–16)
IMM GRANULOCYTES # BLD AUTO: 0.02 X10*3/UL (ref 0–0.7)
IMM GRANULOCYTES NFR BLD AUTO: 0.4 % (ref 0–0.9)
LDLC SERPL CALC-MCNC: 127 MG/DL
LYMPHOCYTES # BLD AUTO: 2.1 X10*3/UL (ref 1.2–4.8)
LYMPHOCYTES NFR BLD AUTO: 39.4 %
MCH RBC QN AUTO: 28.2 PG (ref 26–34)
MCHC RBC AUTO-ENTMCNC: 32.7 G/DL (ref 32–36)
MCV RBC AUTO: 86 FL (ref 80–100)
MONOCYTES # BLD AUTO: 0.41 X10*3/UL (ref 0.1–1)
MONOCYTES NFR BLD AUTO: 7.7 %
NEUTROPHILS # BLD AUTO: 2.59 X10*3/UL (ref 1.2–7.7)
NEUTROPHILS NFR BLD AUTO: 48.5 %
NON HDL CHOLESTEROL: 153 MG/DL (ref 0–149)
NRBC BLD-RTO: 0 /100 WBCS (ref 0–0)
PLATELET # BLD AUTO: 238 X10*3/UL (ref 150–450)
POTASSIUM SERPL-SCNC: 4.2 MMOL/L (ref 3.5–5.3)
PROT SERPL-MCNC: 7.2 G/DL (ref 6.4–8.2)
RBC # BLD AUTO: 4.85 X10*6/UL (ref 4–5.2)
SODIUM SERPL-SCNC: 139 MMOL/L (ref 136–145)
TRIGL SERPL-MCNC: 133 MG/DL (ref 0–149)
VLDL: 27 MG/DL (ref 0–40)
WBC # BLD AUTO: 5.3 X10*3/UL (ref 4.4–11.3)

## 2024-05-13 PROCEDURE — 36415 COLL VENOUS BLD VENIPUNCTURE: CPT

## 2024-05-13 PROCEDURE — 85025 COMPLETE CBC W/AUTO DIFF WBC: CPT

## 2024-05-13 PROCEDURE — 80061 LIPID PANEL: CPT

## 2024-05-13 PROCEDURE — 80053 COMPREHEN METABOLIC PANEL: CPT

## 2024-05-13 ASSESSMENT — ENCOUNTER SYMPTOMS
COUGH: 1
SORE THROAT: 0
NAUSEA: 0
VOMITING: 0
JOINT SWELLING: 0
VERTIGO: 0
ABDOMINAL PAIN: 0
FEVER: 0
VISUAL CHANGE: 0
NUMBNESS: 0
ARTHRALGIAS: 0
SWOLLEN GLANDS: 0
CHANGE IN BOWEL HABIT: 0
CHILLS: 0
FATIGUE: 0
HEADACHES: 0
NECK PAIN: 0
MYALGIAS: 0
ANOREXIA: 0
WEAKNESS: 0

## 2024-05-13 NOTE — PROGRESS NOTES
Patient ID: Geena Sánchez is a 64 y.o. female.    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  Date of diagnosis is January 31, 2023 from a transbronchial biopsy of the lingula.  Immunohistochemistry positive for INSM1, synaptophysin, chromogranin and TTF-1  and negative for p40 GATA3 and ER.  Ki-67 is 10%.  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 - LOF  TMB 2.6 m/MB  MSI stable        SERUM TUMOR MARKER     Not yet performed        PRIOR THERAPY     1-none        CURRENT THERAPY     1-Patient has opted for observation April 2023  2- Given positive PET/NET imaging consideration to treatment with frontline octreotide in future  3-consideration for SBRT lesion to solitary growing lesion in the future        CURRENT ONCOLOGICAL PROBLEMS     1-chronic cough and asthma  2-  Rule out hereditary syndromes given multiple cancers within the family around the same age - NGS shows APC alterations, seen by Genetics Medicine, no familial syndrome identified  3- Pneumonia Nov 2023        HISTORY OF PRESENT ILLNESS     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  This gets worse during the fall and winter.  She has used inhalers but nothing seems to have worked in the past.  It affects her sleeping.   Most recently she was started on Singulair and it has worked.  She also notes a history of pneumonia 1997.  Somewhere around 2022 her cough got worse, there was no flushing no diarrhea she does have some mild stress  incontinence with her cough.  In January 2022 she had a chest x-ray showing some nodules.  In March 2022 she had a CT scan showing bilateral pulmonary nodules up to slightly greater than 1 cm.  She had a PET scan on March 18, 2022 showing only mild uptake.  She had a repeat CT scan in May 2022  showing stability of disease as well as an September 2022.  But by December 2020 to one of the lesions had slightly grown and it was at that time and decided to proceed with a bronchoscopy that was performed on January 31, 2023 showing a well differentiated  neuroendocrine tumor.        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  She lives in [place] with her sister and brother and mother.  Her mother has dementia.  She has never smoked.  She is not .  She does not have children.        CURRENT MEDS     See medication list, meds reviewed        ALLERGIES     Lisinopril and pollen        FAMILY HISTORY     Patient's herself had breast cancer in the past at the age of 48.  Her mother had breast cancer at the age of 61 her father had not Hodgkin's lymphoma at the age of 62.    Subjective    Cancer  Associated symptoms include coughing. Pertinent negatives include no abdominal pain, anorexia, arthralgias, change in bowel habit, chest pain, chills, congestion, fatigue, fever, headaches, joint swelling, myalgias, nausea, neck pain, numbness, rash, sore throat, swollen glands, urinary symptoms, vertigo, visual change, vomiting or weakness.       At the last visit she had a significant cough and her CT scan showed a new infiltrate.  We discussed this with her  pulmonologist and she was started on antibiotics.  Her symptoms resolved after that.  She is no longer coughing significantly her cough is minimal and only baseline, feeling well, working full-time, no fever no hemoptysis urinating well no new headaches and bowel movements regular.      Objective    BSA: 1.94 meters squared  /71 (BP Location: Left arm, Patient Position: Sitting, BP Cuff Size: Adult)   Pulse 71   Temp 36.3 °C (97.3 °F) (Temporal)   Resp 20   Wt 88.9 kg (195 lb 15.8 oz)   SpO2 97%   BMI 38.28 kg/m²      Physical Exam  Constitutional:       General: She is not in acute distress.     Appearance: She is not ill-appearing, toxic-appearing or diaphoretic.   HENT:      Mouth/Throat:      Pharynx: Oropharynx is clear. No oropharyngeal exudate.   Eyes:      General: No scleral icterus.     Conjunctiva/sclera: Conjunctivae normal.   Cardiovascular:      Rate and Rhythm: Normal rate and regular rhythm.      Pulses: Normal pulses.      Heart sounds: Normal heart sounds. No murmur heard.     No friction rub. No gallop.   Pulmonary:      Effort: No respiratory distress.      Breath sounds: No stridor. No wheezing, rhonchi or rales.   Chest:      Chest wall: No tenderness.   Abdominal:      General: Abdomen is flat. Bowel sounds are normal. There is no distension.      Palpations: There is no mass.      Tenderness: There is no abdominal tenderness. There is no guarding or rebound.      Hernia: No hernia is present.   Musculoskeletal:      Cervical back: No tenderness.      Right lower leg: No edema.      Left lower leg: No edema.   Lymphadenopathy:      Cervical: No cervical adenopathy.   Skin:     General: Skin is warm.   Psychiatric:         Mood and Affect: Mood normal.         Behavior: Behavior normal.         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  She has  bilateral lung lesions and given the absence of progression she is on observation only.  She was diagnosed over 16 months ago and her most recent CT scan that I reviewed shows no difference in the size of her lesions compared to her initial scans.  Based on this and her overall absence of symptoms related to the neuroendocrine tumor we continue to opt for observation.  Follow-up appointment in 6 months with repeat imaging.    Cancer Staging   No matching staging information was found for the patient.      Adams Amanda MD  Professor of Medicine and Oncology  [facility]  Kitty Rosas Chair in Lung Cancer  Amari Berger Director  [facility]  Director Phase I Program  [facility]  Thoracic Oncology  [facility]  Chief Academic Officer  [facility]

## 2024-05-15 ENCOUNTER — OFFICE VISIT (OUTPATIENT)
Dept: PRIMARY CARE | Facility: CLINIC | Age: 64
End: 2024-05-15
Payer: COMMERCIAL

## 2024-05-15 ENCOUNTER — PHARMACY VISIT (OUTPATIENT)
Dept: PHARMACY | Facility: CLINIC | Age: 64
End: 2024-05-15
Payer: COMMERCIAL

## 2024-05-15 VITALS
HEIGHT: 59 IN | SYSTOLIC BLOOD PRESSURE: 137 MMHG | OXYGEN SATURATION: 95 % | HEART RATE: 86 BPM | WEIGHT: 196.21 LBS | DIASTOLIC BLOOD PRESSURE: 73 MMHG | BODY MASS INDEX: 39.56 KG/M2 | TEMPERATURE: 96.8 F | RESPIRATION RATE: 16 BRPM

## 2024-05-15 DIAGNOSIS — C7A.8 NEUROENDOCRINE CARCINOMA OF LUNG (MULTI): ICD-10-CM

## 2024-05-15 DIAGNOSIS — I10 ESSENTIAL HYPERTENSION, BENIGN: ICD-10-CM

## 2024-05-15 DIAGNOSIS — Z00.00 ENCOUNTER FOR ANNUAL PHYSICAL EXAM: Primary | ICD-10-CM

## 2024-05-15 DIAGNOSIS — Z12.31 ENCOUNTER FOR SCREENING MAMMOGRAM FOR BREAST CANCER: ICD-10-CM

## 2024-05-15 DIAGNOSIS — Z23 NEED FOR VACCINATION: ICD-10-CM

## 2024-05-15 DIAGNOSIS — Z78.0 ASYMPTOMATIC MENOPAUSAL STATE: ICD-10-CM

## 2024-05-15 PROCEDURE — 3075F SYST BP GE 130 - 139MM HG: CPT | Performed by: INTERNAL MEDICINE

## 2024-05-15 PROCEDURE — 90715 TDAP VACCINE 7 YRS/> IM: CPT | Performed by: INTERNAL MEDICINE

## 2024-05-15 PROCEDURE — 99396 PREV VISIT EST AGE 40-64: CPT | Performed by: INTERNAL MEDICINE

## 2024-05-15 PROCEDURE — 1036F TOBACCO NON-USER: CPT | Performed by: INTERNAL MEDICINE

## 2024-05-15 PROCEDURE — 90471 IMMUNIZATION ADMIN: CPT | Performed by: INTERNAL MEDICINE

## 2024-05-15 PROCEDURE — 3078F DIAST BP <80 MM HG: CPT | Performed by: INTERNAL MEDICINE

## 2024-05-15 PROCEDURE — RXMED WILLOW AMBULATORY MEDICATION CHARGE

## 2024-05-15 RX ORDER — LOSARTAN POTASSIUM AND HYDROCHLOROTHIAZIDE 12.5; 1 MG/1; MG/1
1 TABLET ORAL DAILY
Qty: 90 TABLET | Refills: 1 | Status: SHIPPED | OUTPATIENT
Start: 2024-05-15 | End: 2024-11-11

## 2024-05-15 ASSESSMENT — PATIENT HEALTH QUESTIONNAIRE - PHQ9
2. FEELING DOWN, DEPRESSED OR HOPELESS: NOT AT ALL
SUM OF ALL RESPONSES TO PHQ9 QUESTIONS 1 AND 2: 0
1. LITTLE INTEREST OR PLEASURE IN DOING THINGS: NOT AT ALL

## 2024-05-15 ASSESSMENT — ENCOUNTER SYMPTOMS
LOSS OF SENSATION IN FEET: 0
DEPRESSION: 1
NECK PAIN: 1
OCCASIONAL FEELINGS OF UNSTEADINESS: 0
COUGH: 1

## 2024-05-15 ASSESSMENT — PAIN SCALES - GENERAL: PAINLEVEL: 4

## 2024-05-15 NOTE — ASSESSMENT & PLAN NOTE
Has had Adacel vaccine today.  Recommend Shingrix vaccine.  Last colonoscopy March 2022 has had 1 tubular adenoma recommendation was to repeat in 2025.  Last mammogram October 2023.    Have bone density scan this year.  Follow healthy diet rich in fruits and vegetable, avoid dehydration, start exercising regularly to lose weight.

## 2024-05-15 NOTE — PROGRESS NOTES
"Subjective   Patient ID: Geena Sánchez is a 64 y.o. female who presents for Shoulder Pain, Neck Pain, and Annual Exam.    Annual physical exam.  Last time she was seen was 2 years ago.  She has hypertension, history of cough multiple lungs nodules on CT scan of the chest.  Has had biopsy from one of the nodules in Jan 2023, diagnosed with neuroendocrine malignancy.  She still has cough occasionally.  She follows up with oncologist.  She has history of breast cancer.    Shoulder Pain     Neck Pain          Review of Systems   Respiratory:  Positive for cough.    Musculoskeletal:  Positive for neck pain.   All other systems reviewed and are negative.      Objective   /73 (BP Location: Left arm, Patient Position: Sitting)   Pulse 86   Temp 36 °C (96.8 °F) (Temporal)   Resp 16   Ht 1.503 m (4' 11.17\")   Wt 89 kg (196 lb 3.4 oz)   SpO2 95%   BMI 39.40 kg/m²     Physical Exam  Constitutional:       Appearance: Normal appearance. She is obese.   HENT:      Head: Normocephalic and atraumatic.      Right Ear: External ear normal.      Left Ear: External ear normal.      Mouth/Throat:      Mouth: Mucous membranes are moist.      Pharynx: Oropharynx is clear.   Neck:      Vascular: No carotid bruit.   Cardiovascular:      Rate and Rhythm: Normal rate and regular rhythm.      Heart sounds: No murmur heard.     No gallop.   Pulmonary:      Effort: Pulmonary effort is normal. No respiratory distress.      Breath sounds: No wheezing or rales.   Chest:   Breasts:     Right: Normal.      Left: Normal.   Abdominal:      General: Abdomen is flat.      Palpations: Abdomen is soft.      Hernia: No hernia is present.   Musculoskeletal:         General: No swelling, tenderness, deformity or signs of injury.      Cervical back: No rigidity or tenderness.      Right lower leg: No edema.   Lymphadenopathy:      Cervical: No cervical adenopathy.   Skin:     Coloration: Skin is not jaundiced or pale.      Findings: No bruising, " erythema, lesion or rash.   Neurological:      General: No focal deficit present.      Mental Status: She is oriented to person, place, and time.      Motor: No weakness.      Coordination: Coordination normal.   Psychiatric:         Mood and Affect: Mood normal.         Behavior: Behavior normal.         Assessment/Plan   Problem List Items Addressed This Visit             ICD-10-CM    Essential hypertension, benign I10     Hypertension : controlled blood pressure and continues same medications and educate a low salt diet do not exceed 200 mg per serving. Keep monitor the blood pressure at home. Refill medication.           Relevant Medications    losartan-hydrochlorothiazide (Hyzaar) 100-12.5 mg tablet    Encounter for annual physical exam - Primary Z00.00     Has had Adacel vaccine today.  Recommend Shingrix vaccine.  Last colonoscopy March 2022 has had 1 tubular adenoma recommendation was to repeat in 2025.  Last mammogram October 2023.    Have bone density scan this year.  Follow healthy diet rich in fruits and vegetable, avoid dehydration, start exercising regularly to lose weight.           Neuroendocrine carcinoma of lung (Multi) C7A.8     Follows up with oncologist.         Need for vaccination Z23     Received Adacel vaccine today.         Relevant Orders    Tdap vaccine, age 7 years and older (ADACEL) (Completed)     Other Visit Diagnoses         Codes    Asymptomatic menopausal state     Z78.0    Relevant Orders    XR DEXA bone density    Encounter for screening mammogram for breast cancer     Z12.31    Relevant Orders    BI mammo bilateral screening tomosynthesis

## 2024-05-15 NOTE — ASSESSMENT & PLAN NOTE
Hypertension : controlled blood pressure and continues same medications and educate a low salt diet do not exceed 200 mg per serving. Keep monitor the blood pressure at home. Refill medication.

## 2024-07-02 ENCOUNTER — HOSPITAL ENCOUNTER (EMERGENCY)
Facility: HOSPITAL | Age: 64
Discharge: HOME | End: 2024-07-03
Payer: COMMERCIAL

## 2024-07-02 VITALS
SYSTOLIC BLOOD PRESSURE: 126 MMHG | HEIGHT: 64 IN | WEIGHT: 196.21 LBS | HEART RATE: 82 BPM | DIASTOLIC BLOOD PRESSURE: 80 MMHG | OXYGEN SATURATION: 99 % | BODY MASS INDEX: 33.5 KG/M2 | RESPIRATION RATE: 16 BRPM | TEMPERATURE: 98.6 F

## 2024-07-02 DIAGNOSIS — N17.9 ACUTE KIDNEY INJURY (CMS-HCC): Primary | ICD-10-CM

## 2024-07-02 DIAGNOSIS — L03.011 CELLULITIS OF FINGER OF RIGHT HAND: ICD-10-CM

## 2024-07-02 LAB
ALBUMIN SERPL BCP-MCNC: 4.2 G/DL (ref 3.4–5)
ALP SERPL-CCNC: 70 U/L (ref 33–136)
ALT SERPL W P-5'-P-CCNC: 35 U/L (ref 7–45)
ANION GAP SERPL CALC-SCNC: 16 MMOL/L (ref 10–20)
APPEARANCE UR: CLEAR
AST SERPL W P-5'-P-CCNC: 45 U/L (ref 9–39)
BACTERIA #/AREA URNS AUTO: ABNORMAL /HPF
BASOPHILS # BLD AUTO: 0.02 X10*3/UL (ref 0–0.1)
BASOPHILS NFR BLD AUTO: 0.4 %
BILIRUB SERPL-MCNC: 0.6 MG/DL (ref 0–1.2)
BILIRUB UR STRIP.AUTO-MCNC: ABNORMAL MG/DL
BUN SERPL-MCNC: 21 MG/DL (ref 6–23)
CALCIUM SERPL-MCNC: 8.9 MG/DL (ref 8.6–10.3)
CHLORIDE SERPL-SCNC: 100 MMOL/L (ref 98–107)
CO2 SERPL-SCNC: 21 MMOL/L (ref 21–32)
COLOR UR: YELLOW
CREAT SERPL-MCNC: 1.28 MG/DL (ref 0.5–1.05)
CRP SERPL-MCNC: 10.52 MG/DL
EGFRCR SERPLBLD CKD-EPI 2021: 47 ML/MIN/1.73M*2
EOSINOPHIL # BLD AUTO: 0.04 X10*3/UL (ref 0–0.7)
EOSINOPHIL NFR BLD AUTO: 0.8 %
ERYTHROCYTE [DISTWIDTH] IN BLOOD BY AUTOMATED COUNT: 12.2 % (ref 11.5–14.5)
GLUCOSE SERPL-MCNC: 110 MG/DL (ref 74–99)
GLUCOSE UR STRIP.AUTO-MCNC: NEGATIVE MG/DL
HCT VFR BLD AUTO: 42.1 % (ref 36–46)
HGB BLD-MCNC: 14 G/DL (ref 12–16)
HYALINE CASTS #/AREA URNS AUTO: ABNORMAL /LPF
IMM GRANULOCYTES # BLD AUTO: 0.02 X10*3/UL (ref 0–0.7)
IMM GRANULOCYTES NFR BLD AUTO: 0.4 % (ref 0–0.9)
KETONES UR STRIP.AUTO-MCNC: ABNORMAL MG/DL
LEUKOCYTE ESTERASE UR QL STRIP.AUTO: NEGATIVE
LYMPHOCYTES # BLD AUTO: 0.61 X10*3/UL (ref 1.2–4.8)
LYMPHOCYTES NFR BLD AUTO: 12.4 %
MAGNESIUM SERPL-MCNC: 2.28 MG/DL (ref 1.6–2.4)
MCH RBC QN AUTO: 28.1 PG (ref 26–34)
MCHC RBC AUTO-ENTMCNC: 33.3 G/DL (ref 32–36)
MCV RBC AUTO: 85 FL (ref 80–100)
MONOCYTES # BLD AUTO: 0.55 X10*3/UL (ref 0.1–1)
MONOCYTES NFR BLD AUTO: 11.2 %
MUCOUS THREADS #/AREA URNS AUTO: ABNORMAL /LPF
NEUTROPHILS # BLD AUTO: 3.67 X10*3/UL (ref 1.2–7.7)
NEUTROPHILS NFR BLD AUTO: 74.8 %
NITRITE UR QL STRIP.AUTO: NEGATIVE
NRBC BLD-RTO: ABNORMAL /100{WBCS}
PH UR STRIP.AUTO: 6 [PH]
PLATELET # BLD AUTO: 152 X10*3/UL (ref 150–450)
POTASSIUM SERPL-SCNC: 4.1 MMOL/L (ref 3.5–5.3)
PROT SERPL-MCNC: 7.4 G/DL (ref 6.4–8.2)
PROT UR STRIP.AUTO-MCNC: ABNORMAL MG/DL
RBC # BLD AUTO: 4.98 X10*6/UL (ref 4–5.2)
RBC # UR STRIP.AUTO: NEGATIVE /UL
RBC #/AREA URNS AUTO: ABNORMAL /HPF
SODIUM SERPL-SCNC: 133 MMOL/L (ref 136–145)
SP GR UR STRIP.AUTO: >=1.03
SQUAMOUS #/AREA URNS AUTO: ABNORMAL /HPF
UROBILINOGEN UR STRIP.AUTO-MCNC: 1 MG/DL
WBC # BLD AUTO: 4.9 X10*3/UL (ref 4.4–11.3)
WBC #/AREA URNS AUTO: ABNORMAL /HPF

## 2024-07-02 PROCEDURE — 99283 EMERGENCY DEPT VISIT LOW MDM: CPT | Mod: 25

## 2024-07-02 PROCEDURE — 83735 ASSAY OF MAGNESIUM: CPT | Performed by: HOSPITALIST

## 2024-07-02 PROCEDURE — 96360 HYDRATION IV INFUSION INIT: CPT

## 2024-07-02 PROCEDURE — 36415 COLL VENOUS BLD VENIPUNCTURE: CPT | Performed by: HOSPITALIST

## 2024-07-02 PROCEDURE — 81001 URINALYSIS AUTO W/SCOPE: CPT | Performed by: HOSPITALIST

## 2024-07-02 PROCEDURE — 86140 C-REACTIVE PROTEIN: CPT | Performed by: HOSPITALIST

## 2024-07-02 PROCEDURE — 85025 COMPLETE CBC W/AUTO DIFF WBC: CPT | Performed by: HOSPITALIST

## 2024-07-02 PROCEDURE — 84075 ASSAY ALKALINE PHOSPHATASE: CPT | Performed by: HOSPITALIST

## 2024-07-02 PROCEDURE — 2500000004 HC RX 250 GENERAL PHARMACY W/ HCPCS (ALT 636 FOR OP/ED): Performed by: HOSPITALIST

## 2024-07-02 ASSESSMENT — PAIN - FUNCTIONAL ASSESSMENT: PAIN_FUNCTIONAL_ASSESSMENT: 0-10

## 2024-07-02 ASSESSMENT — PAIN SCALES - GENERAL: PAINLEVEL_OUTOF10: 0 - NO PAIN

## 2024-07-02 ASSESSMENT — COLUMBIA-SUICIDE SEVERITY RATING SCALE - C-SSRS
6. HAVE YOU EVER DONE ANYTHING, STARTED TO DO ANYTHING, OR PREPARED TO DO ANYTHING TO END YOUR LIFE?: NO
2. HAVE YOU ACTUALLY HAD ANY THOUGHTS OF KILLING YOURSELF?: NO
1. IN THE PAST MONTH, HAVE YOU WISHED YOU WERE DEAD OR WISHED YOU COULD GO TO SLEEP AND NOT WAKE UP?: NO

## 2024-07-03 DIAGNOSIS — I10 ESSENTIAL HYPERTENSION, BENIGN: ICD-10-CM

## 2024-07-03 DIAGNOSIS — R79.82 HIGH C-REACTIVE PROTEIN: Primary | ICD-10-CM

## 2024-07-03 LAB — HOLD SPECIMEN: NORMAL

## 2024-07-03 RX ORDER — CEFUROXIME AXETIL 500 MG/1
500 TABLET ORAL 2 TIMES DAILY
Qty: 8 TABLET | Refills: 0 | Status: SHIPPED | OUTPATIENT
Start: 2024-07-03 | End: 2024-07-07

## 2024-07-03 NOTE — ED PROVIDER NOTES
HPI   Chief Complaint   Patient presents with    Chills       HPI  64-year-old female presents to the emergency room complaining of chills.  She recently injured her her finger while holding the leash of her dog.  She had noted white pustules and went to the urgent care last week and was prescribed Bactrim and mupirocin.  After starting treatment she felt chills and today had a low-grade temperature.  At home she measured 100.2 °F.  She took Tylenol this afternoon prior to ER arrival.      Patient History   Past Medical History:   Diagnosis Date    Arthritis     Benign paroxysmal vertigo, unspecified ear     Benign paroxysmal positional vertigo    Breast cancer (Multi)     Chronic cough 07/02/2015    Chronic cough    Eczema     Hypertension     Other conditions influencing health status     Breast Cancer    Pain in right elbow 05/27/2014    Right elbow pain    Pain in right elbow 04/16/2014    Right elbow pain    Personal history of diseases of the skin and subcutaneous tissue 03/31/2014    History of abscess of skin and subcutaneous tissue    Personal history of irradiation     Personal history of Methicillin resistant Staphylococcus aureus infection 04/10/2014    History of methicillin resistant Staphylococcus aureus infection    Personal history of other diseases of the respiratory system 02/24/2015    History of asthma    Personal history of other diseases of the respiratory system 12/04/2014    History of acute bronchitis    Personal history of other infectious and parasitic diseases 03/31/2014    History of tinea corporis    Personal history of other specified conditions 02/12/2015    History of shortness of breath    Personal history of other specified conditions 05/19/2014    History of edema    Personal history of pneumonia (recurrent)     History of bacterial pneumonia    Postmenopausal atrophic vaginitis 12/11/2017    Vaginal atrophy    Postnasal drip 07/02/2015    Postnasal drip    Postnasal drip  02/23/2015    Post-nasal drainage    Rash and other nonspecific skin eruption 07/14/2014    Rash    Unspecified fracture of lower end of right humerus, initial encounter for closed fracture 05/19/2014    Elbow fracture, right     Past Surgical History:   Procedure Laterality Date    BREAST LUMPECTOMY Right 03/13/2013    Breast Surgery Lumpectomy with radiation    CHOLECYSTECTOMY  03/13/2013    Cholecystectomy    LYMPH NODE BIOPSY  2008    OTHER SURGICAL HISTORY  03/13/2013    Tonsillectomy Over Age 12    OTHER SURGICAL HISTORY  03/13/2013    Dilation Of Female Urethra    TONSILLECTOMY       Family History   Problem Relation Name Age of Onset    Hyperlipidemia Mother Toño Sánchez     Breast cancer Mother Toño Sánchez     Atrial fibrillation Mother Toño Sánchez     Other (mitral valve paravalvular leak repair) Mother Toño Sánchez     Other (thyroid surgery heydi thyroidectomy) Mother Toño Sánchez     Hypertension Father Elliott Sánchez     Other (non hodgkin's lymphoma) Father Elliott Sánchez     Arthritis Father Elliott Sánchez     Atrial fibrillation Father Elliott Sánchez     Heart disease Father Elliott Sánchez     Other (ischemic stroke) Maternal Grandmother      Prostate cancer Maternal Grandfather      Lung cancer Paternal Grandfather      Diabetes Sister Belinda Paula      Social History     Tobacco Use    Smoking status: Never    Smokeless tobacco: Never   Substance Use Topics    Alcohol use: Yes     Comment: Twice per year    Drug use: Never       Physical Exam   ED Triage Vitals [07/02/24 2116]   Temperature Heart Rate Respirations BP   37 °C (98.6 °F) 82 16 126/80      Pulse Ox Temp src Heart Rate Source Patient Position   99 % -- -- --      BP Location FiO2 (%)     -- 21 %       Physical Exam  Gen.: Alert and oriented ×3, no acute distress  Head: Normocephalic atraumatic  Eyes:  Pupils equal reactive to light, extraocular muscle intact  Neck: No cervical lymphadenopathy thyromegaly,  trachea midline   Heart: Regular rate and rhythm, no murmurs rubs or gallops  Lungs: Clear to auscultation bilaterally, no wheezes, rales, or rhonchi  Abdomen: Soft nontender positive bowel sounds, no rebound or guarding  Extremities: No peripheral edema cyanosis or clubbing  Skin: Warm and intact, skin breakdown palmar aspect of second and third proximal digits.  No drainage no surrounding erythema  Neuro: Cranial nerves II 2 through 12 grossly intact, no focal deficits  Psych: Insight and judgment intact    Labs Reviewed   CBC WITH AUTO DIFFERENTIAL - Abnormal       Result Value    WBC 4.9      nRBC        RBC 4.98      Hemoglobin 14.0      Hematocrit 42.1      MCV 85      MCH 28.1      MCHC 33.3      RDW 12.2      Platelets 152      Neutrophils % 74.8      Immature Granulocytes %, Automated 0.4      Lymphocytes % 12.4      Monocytes % 11.2      Eosinophils % 0.8      Basophils % 0.4      Neutrophils Absolute 3.67      Immature Granulocytes Absolute, Automated 0.02      Lymphocytes Absolute 0.61 (*)     Monocytes Absolute 0.55      Eosinophils Absolute 0.04      Basophils Absolute 0.02     COMPREHENSIVE METABOLIC PANEL - Abnormal    Glucose 110 (*)     Sodium 133 (*)     Potassium 4.1      Chloride 100      Bicarbonate 21      Anion Gap 16      Urea Nitrogen 21      Creatinine 1.28 (*)     eGFR 47 (*)     Calcium 8.9      Albumin 4.2      Alkaline Phosphatase 70      Total Protein 7.4      AST 45 (*)     Bilirubin, Total 0.6      ALT 35     C-REACTIVE PROTEIN - Abnormal    C-Reactive Protein 10.52 (*)    URINALYSIS WITH REFLEX CULTURE AND MICROSCOPIC - Abnormal    Color, Urine Yellow      Appearance, Urine Clear      Specific Gravity, Urine >=1.030      pH, Urine 6.0      Protein, Urine TRACE      Glucose, Urine NEGATIVE      Blood, Urine NEGATIVE      Ketones, Urine TRACE (*)     Bilirubin, Urine SMALL (1+) (*)     Urobilinogen, Urine 1.0      Nitrite, Urine NEGATIVE      Leukocyte Esterase, Urine NEGATIVE      URINALYSIS MICROSCOPIC WITH REFLEX CULTURE - Abnormal    WBC, Urine 1-5      RBC, Urine NONE      Squamous Epithelial Cells, Urine 1-9 (SPARSE)      Bacteria, Urine 1+ (*)     Mucus, Urine 1+      Hyaline Casts, Urine 1+ (*)    MAGNESIUM - Normal    Magnesium 2.28     URINALYSIS WITH REFLEX CULTURE AND MICROSCOPIC    Narrative:     The following orders were created for panel order Urinalysis with Reflex Culture and Microscopic.  Procedure                               Abnormality         Status                     ---------                               -----------         ------                     Urinalysis with Reflex C...[[id number]]  Abnormal            Final result               Extra Urine Gray Tube[[id number]]                            In process                   Please view results for these tests on the individual orders.   EXTRA URINE GRAY TUBE   URINALYSIS WITH REFLEX CULTURE AND MICROSCOPIC    Narrative:     The following orders were created for panel order Urinalysis with Reflex Culture and Microscopic.  Procedure                               Abnormality         Status                     ---------                               -----------         ------                     Urinalysis with Reflex C...[[id number]]                                                 Extra Urine Gray Tube[[id number]]                                                         Please view results for these tests on the individual orders.   URINALYSIS WITH REFLEX CULTURE AND MICROSCOPIC   EXTRA URINE GRAY TUBE        ED Course & MDM   Diagnoses as of 07/03/24 0105   Acute kidney injury (CMS-HCC)   Cellulitis of finger of right hand       Medical Decision Making  Patient's lab work was notable for mildly decreased sodium 133 and elevated creatinine 1.28.  CRP was 10.52.  Urinalysis unremarkable.  In the ER she was given IV fluids, 1 L normal saline.  Examination of finger revealed no active signs of infection and appeared to be  healing.  Patient already completed 3 days of Bactrim.  She was recommended discontinue Bactrim use and was given prescription for Keflex to continue for total of 7 days.  Patient was advised to hold her losartan/hydrochlorothiazide for the next 48 hours to allow her kidney function improved.  She was recommended to have a basic metabolic profile done next week to confirm improved renal function.       Hank Sharma, DO  07/03/24 0028       Hank Sharma,   07/03/24 0105

## 2024-07-03 NOTE — DISCHARGE INSTRUCTIONS
A requisition for lab work has been transmitted, please have lab work performed by Monday, July 8.  Please hold losartan/HCTZ for the next 48 hours  Please your fluid intake  Continue with antibiotics for the next 4 days and then discontinue.

## 2024-07-03 NOTE — ED TRIAGE NOTES
Pt had a finger injury while walking her pet and was prescribed bactrim. Pt complains of chills since she has taken bactrim and also reports low grade fever of 100.2 yesterday. She is concerned for cellulitis. A&Ox3, RA, no pain.

## 2024-07-05 DIAGNOSIS — R11.0 NAUSEA: Primary | ICD-10-CM

## 2024-07-05 RX ORDER — ONDANSETRON 4 MG/1
4 TABLET, FILM COATED ORAL EVERY 8 HOURS PRN
Qty: 10 TABLET | Refills: 0 | Status: SHIPPED | OUTPATIENT
Start: 2024-07-05 | End: 2024-07-12

## 2024-07-12 ENCOUNTER — LAB (OUTPATIENT)
Dept: LAB | Facility: LAB | Age: 64
End: 2024-07-12
Payer: COMMERCIAL

## 2024-07-12 DIAGNOSIS — I10 ESSENTIAL HYPERTENSION, BENIGN: ICD-10-CM

## 2024-07-12 LAB
ANION GAP SERPL CALC-SCNC: 15 MMOL/L (ref 10–20)
BUN SERPL-MCNC: 14 MG/DL (ref 6–23)
CALCIUM SERPL-MCNC: 9.3 MG/DL (ref 8.6–10.6)
CHLORIDE SERPL-SCNC: 100 MMOL/L (ref 98–107)
CO2 SERPL-SCNC: 28 MMOL/L (ref 21–32)
CREAT SERPL-MCNC: 0.71 MG/DL (ref 0.5–1.05)
EGFRCR SERPLBLD CKD-EPI 2021: >90 ML/MIN/1.73M*2
GLUCOSE SERPL-MCNC: 86 MG/DL (ref 74–99)
POTASSIUM SERPL-SCNC: 4.6 MMOL/L (ref 3.5–5.3)
SODIUM SERPL-SCNC: 138 MMOL/L (ref 136–145)

## 2024-07-12 PROCEDURE — 36415 COLL VENOUS BLD VENIPUNCTURE: CPT

## 2024-07-12 PROCEDURE — 80048 BASIC METABOLIC PNL TOTAL CA: CPT

## 2024-07-16 ENCOUNTER — OFFICE VISIT (OUTPATIENT)
Dept: PRIMARY CARE | Facility: CLINIC | Age: 64
End: 2024-07-16
Payer: COMMERCIAL

## 2024-07-16 ENCOUNTER — APPOINTMENT (OUTPATIENT)
Dept: PRIMARY CARE | Facility: CLINIC | Age: 64
End: 2024-07-16
Payer: COMMERCIAL

## 2024-07-16 VITALS
HEART RATE: 78 BPM | DIASTOLIC BLOOD PRESSURE: 80 MMHG | BODY MASS INDEX: 33.19 KG/M2 | WEIGHT: 193.34 LBS | TEMPERATURE: 97.5 F | OXYGEN SATURATION: 95 % | SYSTOLIC BLOOD PRESSURE: 120 MMHG | RESPIRATION RATE: 16 BRPM

## 2024-07-16 DIAGNOSIS — N17.9 ACUTE KIDNEY INJURY (CMS-HCC): Primary | ICD-10-CM

## 2024-07-16 DIAGNOSIS — I10 ESSENTIAL HYPERTENSION, BENIGN: ICD-10-CM

## 2024-07-16 DIAGNOSIS — L03.011 CELLULITIS OF FINGER OF RIGHT HAND: ICD-10-CM

## 2024-07-16 PROCEDURE — 99213 OFFICE O/P EST LOW 20 MIN: CPT | Performed by: INTERNAL MEDICINE

## 2024-07-16 PROCEDURE — 3079F DIAST BP 80-89 MM HG: CPT | Performed by: INTERNAL MEDICINE

## 2024-07-16 PROCEDURE — 1036F TOBACCO NON-USER: CPT | Performed by: INTERNAL MEDICINE

## 2024-07-16 PROCEDURE — 3074F SYST BP LT 130 MM HG: CPT | Performed by: INTERNAL MEDICINE

## 2024-07-16 ASSESSMENT — PATIENT HEALTH QUESTIONNAIRE - PHQ9
SUM OF ALL RESPONSES TO PHQ9 QUESTIONS 1 AND 2: 2
1. LITTLE INTEREST OR PLEASURE IN DOING THINGS: SEVERAL DAYS
2. FEELING DOWN, DEPRESSED OR HOPELESS: SEVERAL DAYS
10. IF YOU CHECKED OFF ANY PROBLEMS, HOW DIFFICULT HAVE THESE PROBLEMS MADE IT FOR YOU TO DO YOUR WORK, TAKE CARE OF THINGS AT HOME, OR GET ALONG WITH OTHER PEOPLE: NOT DIFFICULT AT ALL

## 2024-07-16 ASSESSMENT — ENCOUNTER SYMPTOMS
DIZZINESS: 0
DYSURIA: 0
POLYPHAGIA: 0
HEMATURIA: 0
PHOTOPHOBIA: 0
OCCASIONAL FEELINGS OF UNSTEADINESS: 1
ADENOPATHY: 0
HEADACHES: 0
LOSS OF SENSATION IN FEET: 0
SPEECH DIFFICULTY: 0
COUGH: 0
TREMORS: 0
PALPITATIONS: 0
APPETITE CHANGE: 0
BRUISES/BLEEDS EASILY: 0
FLANK PAIN: 0
SEIZURES: 0
CHILLS: 0
DEPRESSION: 0
DIAPHORESIS: 0
NUMBNESS: 0
WEAKNESS: 0
STRIDOR: 0
ARTHRALGIAS: 0
BACK PAIN: 0
FATIGUE: 0

## 2024-07-16 NOTE — PROGRESS NOTES
Subjective   Patient ID: Geena Sánchez is a 64 y.o. female who presents for Follow-up.    Follow-up visit.  Last month she sustained an injury on her right small finger, developed cellulitis.  Was seen at an urgent care prescribed Bactrim twice a day.  She developed chills low-grade fever.  By the third day went to emergency room found to have low GFR acute kidney injury.  Advised to stop Bactrim and prescribed Keflex.  Repeated BMP shows normal GFR over 90.  Wound healed.         Review of Systems   Constitutional:  Negative for appetite change, chills, diaphoresis and fatigue.   HENT:  Negative for congestion, ear discharge, hearing loss, mouth sores and postnasal drip.    Eyes:  Negative for photophobia and visual disturbance.   Respiratory:  Negative for cough and stridor.    Cardiovascular:  Negative for palpitations and leg swelling.   Endocrine: Negative for cold intolerance, heat intolerance, polyphagia and polyuria.   Genitourinary:  Negative for decreased urine volume, dysuria, enuresis, flank pain and hematuria.   Musculoskeletal:  Negative for arthralgias, back pain and gait problem.   Allergic/Immunologic: Negative for food allergies and immunocompromised state.   Neurological:  Negative for dizziness, tremors, seizures, syncope, speech difficulty, weakness, numbness and headaches.   Hematological:  Negative for adenopathy. Does not bruise/bleed easily.       Objective   /80 (BP Location: Left arm, Patient Position: Sitting)   Pulse 78   Temp 36.4 °C (97.5 °F) (Temporal)   Resp 16   Wt 87.7 kg (193 lb 5.5 oz)   SpO2 95%   BMI 33.19 kg/m²     Physical Exam  Constitutional:       Appearance: Normal appearance. She is obese.   HENT:      Head: Normocephalic and atraumatic.      Right Ear: External ear normal.      Left Ear: External ear normal.      Mouth/Throat:      Mouth: Mucous membranes are moist.      Pharynx: Oropharynx is clear.   Neck:      Vascular: No carotid bruit.    Cardiovascular:      Rate and Rhythm: Normal rate and regular rhythm.      Heart sounds: No murmur heard.     No gallop.   Pulmonary:      Effort: Pulmonary effort is normal. No respiratory distress.      Breath sounds: No wheezing or rales.   Abdominal:      General: Abdomen is flat.      Palpations: Abdomen is soft.      Hernia: No hernia is present.   Musculoskeletal:         General: No swelling, tenderness, deformity or signs of injury.      Cervical back: No rigidity or tenderness.      Right lower leg: No edema.   Lymphadenopathy:      Cervical: No cervical adenopathy.   Skin:     Coloration: Skin is not jaundiced or pale.      Findings: No bruising, erythema, lesion or rash.   Neurological:      General: No focal deficit present.      Mental Status: She is oriented to person, place, and time.      Motor: No weakness.      Coordination: Coordination normal.   Psychiatric:         Mood and Affect: Mood normal.         Behavior: Behavior normal.         Assessment/Plan   Problem List Items Addressed This Visit             ICD-10-CM    Essential hypertension, benign I10    Acute kidney injury (CMS-HCC) - Primary N17.9     Resolved , last GFR GFR 90.  Was due to combination of  Bactrim,ARB and diuretic.         Cellulitis of finger of right hand L03.011     Cellulitis resolved , treated with 3 days of Bactrim and 4 days of Keflex.

## 2024-09-05 PROCEDURE — RXMED WILLOW AMBULATORY MEDICATION CHARGE

## 2024-09-06 ENCOUNTER — PHARMACY VISIT (OUTPATIENT)
Dept: PHARMACY | Facility: CLINIC | Age: 64
End: 2024-09-06
Payer: COMMERCIAL

## 2024-11-04 ENCOUNTER — APPOINTMENT (OUTPATIENT)
Dept: RADIOLOGY | Facility: HOSPITAL | Age: 64
End: 2024-11-04
Payer: COMMERCIAL

## 2024-11-15 ENCOUNTER — HOSPITAL ENCOUNTER (OUTPATIENT)
Dept: RADIOLOGY | Facility: HOSPITAL | Age: 64
Discharge: HOME | End: 2024-11-15
Payer: COMMERCIAL

## 2024-11-15 DIAGNOSIS — D3A.8 NEUROENDOCRINE NEOPLASM OF LUNG (CMS-HCC): ICD-10-CM

## 2024-11-15 PROCEDURE — 71250 CT THORAX DX C-: CPT

## 2024-11-18 ENCOUNTER — OFFICE VISIT (OUTPATIENT)
Dept: HEMATOLOGY/ONCOLOGY | Facility: HOSPITAL | Age: 64
End: 2024-11-18
Payer: COMMERCIAL

## 2024-11-18 VITALS
DIASTOLIC BLOOD PRESSURE: 74 MMHG | OXYGEN SATURATION: 95 % | BODY MASS INDEX: 33.26 KG/M2 | SYSTOLIC BLOOD PRESSURE: 147 MMHG | TEMPERATURE: 97.7 F | HEART RATE: 83 BPM | WEIGHT: 193.78 LBS | RESPIRATION RATE: 18 BRPM

## 2024-11-18 DIAGNOSIS — D3A.8 NEUROENDOCRINE NEOPLASM OF LUNG (CMS-HCC): ICD-10-CM

## 2024-11-18 PROCEDURE — 3077F SYST BP >= 140 MM HG: CPT | Performed by: INTERNAL MEDICINE

## 2024-11-18 PROCEDURE — 1036F TOBACCO NON-USER: CPT | Performed by: INTERNAL MEDICINE

## 2024-11-18 PROCEDURE — 99214 OFFICE O/P EST MOD 30 MIN: CPT | Performed by: INTERNAL MEDICINE

## 2024-11-18 PROCEDURE — 3078F DIAST BP <80 MM HG: CPT | Performed by: INTERNAL MEDICINE

## 2024-11-18 ASSESSMENT — ENCOUNTER SYMPTOMS
MYALGIAS: 0
FEVER: 0
ABDOMINAL PAIN: 0
COUGH: 1
SWOLLEN GLANDS: 0
VISUAL CHANGE: 0
CHILLS: 0
DIAPHORESIS: 0
VOMITING: 0
ANOREXIA: 0
NAUSEA: 0
ARTHRALGIAS: 0
JOINT SWELLING: 0
NUMBNESS: 0
HEADACHES: 0
VERTIGO: 0
WEAKNESS: 0
FATIGUE: 0
NECK PAIN: 0
SORE THROAT: 0
CHANGE IN BOWEL HABIT: 0

## 2024-11-18 ASSESSMENT — PAIN SCALES - GENERAL: PAINLEVEL_OUTOF10: 4

## 2024-11-18 NOTE — PROGRESS NOTES
Patient ID: Geena Sánchez is a 64 y.o. female.    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  Date of diagnosis is January 31, 2023 from a transbronchial biopsy of the lingula.  Immunohistochemistry positive for INSM1, synaptophysin, chromogranin and TTF-1  and negative for p40 GATA3 and ER.  Ki-67 is 10%.  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 - LOF  TMB 2.6 m/MB  MSI stable        SERUM TUMOR MARKER     Not yet performed        PRIOR THERAPY     1-none        CURRENT THERAPY     1-Patient has opted for observation April 2023  2- Given positive PET/NET imaging consideration to treatment with frontline octreotide in future  3-consideration for SBRT lesion to solitary growing lesion in the future        CURRENT ONCOLOGICAL PROBLEMS     1-chronic cough and asthma  2-  Rule out hereditary syndromes given multiple cancers within the family around the same age - NGS shows APC alterations, seen by Genetics Medicine, no familial syndrome identified  3- Pneumonia Nov 2023        HISTORY OF PRESENT ILLNESS     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  This gets worse during the fall and winter.  She has used inhalers but nothing seems to have worked in the past.  It affects her sleeping.   Most recently she was started on Singulair and it has worked.  She also notes a history of pneumonia 1997.  Somewhere around 2022 her cough got worse, there was no flushing no diarrhea she does have some mild stress  incontinence with her cough.  In January 2022 she had a chest x-ray showing some nodules.  In March 2022 she had a CT scan showing bilateral pulmonary nodules up to slightly greater than 1 cm.  She had a PET scan on March 18, 2022 showing only mild uptake.  She had a repeat CT scan in May 2022  showing stability of disease as well as an September 2022.  But by December 2020 to one of the lesions had slightly grown and it was at that time and decided to proceed with a bronchoscopy that was performed on January 31, 2023 showing a well differentiated  neuroendocrine tumor.        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.  She lives in [place] with her sister and brother and mother.  Her mother has dementia.  She has never smoked.  She is not .  She does not have children.        CURRENT MEDS     See medication list, meds reviewed        ALLERGIES     Lisinopril and pollen        FAMILY HISTORY     Patient's herself had breast cancer in the past at the age of 48.  Her mother had breast cancer at the age of 61 her father had not Hodgkin's lymphoma at the age of 62.         Subjective    Cancer  Associated symptoms include coughing. Pertinent negatives include no abdominal pain, anorexia, arthralgias, change in bowel habit, chest pain, chills, congestion, diaphoresis, fatigue, fever, headaches, joint swelling, myalgias, nausea, neck pain, numbness, rash, sore throat, swollen glands, urinary symptoms, vertigo, visual change, vomiting or weakness.         Objective    BSA: 1.99 meters squared  /74 (BP Location: Left arm, Patient Position: Sitting,  BP Cuff Size: Adult)   Pulse 83   Temp 36.5 °C (97.7 °F) (Temporal)   Resp 18   Wt 87.9 kg (193 lb 12.6 oz)   SpO2 95%   BMI 33.26 kg/m²      Physical Exam  Constitutional:       General: She is not in acute distress.     Appearance: Normal appearance. She is not ill-appearing, toxic-appearing or diaphoretic.   HENT:      Nose: No congestion or rhinorrhea.      Mouth/Throat:      Pharynx: No oropharyngeal exudate or posterior oropharyngeal erythema.   Eyes:      General: No scleral icterus.     Conjunctiva/sclera: Conjunctivae normal.   Cardiovascular:      Rate and Rhythm: Normal rate and regular rhythm.      Pulses: Normal pulses.      Heart sounds: Normal heart sounds. No murmur heard.     No friction rub. No gallop.   Pulmonary:      Effort: Pulmonary effort is normal. No respiratory distress.      Breath sounds: Normal breath sounds. No stridor. No wheezing, rhonchi or rales.   Chest:      Chest wall: No tenderness.   Abdominal:      General: Abdomen is flat. Bowel sounds are normal. There is no distension.      Palpations: Abdomen is soft. There is no mass.      Tenderness: There is no abdominal tenderness. There is no guarding or rebound.   Musculoskeletal:      Cervical back: No tenderness.      Right lower leg: No edema.      Left lower leg: No edema.   Lymphadenopathy:      Cervical: No cervical adenopathy.   Psychiatric:         Mood and Affect: Mood normal.         Performance Status:  Asymptomatic      CT CHEST WO IV CONTRAST; 11/15/2024   IMPRESSION:  1.  There are extensive parenchymal lung nodules which are  essentially stable in size and number consistent with the history of  metastatic neuroendocrine tumor. No new lesions or significant change  in size of multiple pulmonary nodules.  2. Hepatic steatosis.      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  Her CT scan done in follow-up shows no evidence of progression.  When I compare her imaging to those of March 2022 a lesion within the right upper lobe has grown by only 1 to 2 mm.  Otherwise everything has remained relatively stable.  Based on this we will see her back in 6 months time with repeat imaging studies.    Adams Amanda MD  Professor of Medicine and Oncology  [facility]  Kitty Rosas Chair in Lung Cancer  Amari Berger Director  [facility]  Director Phase I Program  [facility]  Thoracic Oncology  [facility]  Chief Academic Officer  [facility]

## 2024-11-19 ENCOUNTER — APPOINTMENT (OUTPATIENT)
Dept: PRIMARY CARE | Facility: CLINIC | Age: 64
End: 2024-11-19
Payer: COMMERCIAL

## 2024-11-22 ENCOUNTER — HOSPITAL ENCOUNTER (OUTPATIENT)
Dept: RADIOLOGY | Facility: HOSPITAL | Age: 64
Discharge: HOME | End: 2024-11-22
Payer: COMMERCIAL

## 2024-11-22 VITALS — WEIGHT: 193 LBS | HEIGHT: 60 IN | BODY MASS INDEX: 37.89 KG/M2

## 2024-11-22 DIAGNOSIS — Z78.0 ASYMPTOMATIC MENOPAUSAL STATE: ICD-10-CM

## 2024-11-22 DIAGNOSIS — Z12.31 ENCOUNTER FOR SCREENING MAMMOGRAM FOR BREAST CANCER: ICD-10-CM

## 2024-11-22 PROCEDURE — 77067 SCR MAMMO BI INCL CAD: CPT

## 2024-11-22 PROCEDURE — 77080 DXA BONE DENSITY AXIAL: CPT

## 2024-11-26 ENCOUNTER — APPOINTMENT (OUTPATIENT)
Dept: PRIMARY CARE | Facility: CLINIC | Age: 64
End: 2024-11-26
Payer: COMMERCIAL

## 2024-12-09 ENCOUNTER — APPOINTMENT (OUTPATIENT)
Dept: PRIMARY CARE | Facility: CLINIC | Age: 64
End: 2024-12-09
Payer: COMMERCIAL

## 2024-12-09 VITALS
HEART RATE: 81 BPM | OXYGEN SATURATION: 94 % | SYSTOLIC BLOOD PRESSURE: 121 MMHG | DIASTOLIC BLOOD PRESSURE: 70 MMHG | WEIGHT: 195.11 LBS | RESPIRATION RATE: 16 BRPM | BODY MASS INDEX: 38.1 KG/M2

## 2024-12-09 DIAGNOSIS — E66.812 CLASS 2 OBESITY DUE TO EXCESS CALORIES WITHOUT SERIOUS COMORBIDITY WITH BODY MASS INDEX (BMI) OF 38.0 TO 38.9 IN ADULT: ICD-10-CM

## 2024-12-09 DIAGNOSIS — Z23 NEED FOR IMMUNIZATION AGAINST INFLUENZA: ICD-10-CM

## 2024-12-09 DIAGNOSIS — I10 ESSENTIAL HYPERTENSION, BENIGN: Primary | ICD-10-CM

## 2024-12-09 DIAGNOSIS — R79.89 HIGH SERUM LOW-DENSITY LIPOPROTEIN (LDL): ICD-10-CM

## 2024-12-09 DIAGNOSIS — E66.09 CLASS 2 OBESITY DUE TO EXCESS CALORIES WITHOUT SERIOUS COMORBIDITY WITH BODY MASS INDEX (BMI) OF 38.0 TO 38.9 IN ADULT: ICD-10-CM

## 2024-12-09 PROBLEM — R19.5 POSITIVE OCCULT STOOL BLOOD TEST: Status: RESOLVED | Noted: 2023-11-08 | Resolved: 2024-12-09

## 2024-12-09 PROBLEM — U07.1 COVID-19 VIRUS DETECTED: Status: RESOLVED | Noted: 2023-11-08 | Resolved: 2024-12-09

## 2024-12-09 PROCEDURE — 3074F SYST BP LT 130 MM HG: CPT | Performed by: INTERNAL MEDICINE

## 2024-12-09 PROCEDURE — 3078F DIAST BP <80 MM HG: CPT | Performed by: INTERNAL MEDICINE

## 2024-12-09 PROCEDURE — 90656 IIV3 VACC NO PRSV 0.5 ML IM: CPT | Performed by: INTERNAL MEDICINE

## 2024-12-09 PROCEDURE — 1036F TOBACCO NON-USER: CPT | Performed by: INTERNAL MEDICINE

## 2024-12-09 PROCEDURE — 99214 OFFICE O/P EST MOD 30 MIN: CPT | Performed by: INTERNAL MEDICINE

## 2024-12-09 PROCEDURE — 90471 IMMUNIZATION ADMIN: CPT | Performed by: INTERNAL MEDICINE

## 2024-12-09 ASSESSMENT — PATIENT HEALTH QUESTIONNAIRE - PHQ9
SUM OF ALL RESPONSES TO PHQ9 QUESTIONS 1 AND 2: 0
1. LITTLE INTEREST OR PLEASURE IN DOING THINGS: NOT AT ALL
2. FEELING DOWN, DEPRESSED OR HOPELESS: NOT AT ALL

## 2024-12-09 ASSESSMENT — ENCOUNTER SYMPTOMS
LOSS OF SENSATION IN FEET: 0
OCCASIONAL FEELINGS OF UNSTEADINESS: 0
COUGH: 1
DEPRESSION: 0

## 2024-12-09 ASSESSMENT — PAIN SCALES - GENERAL: PAINLEVEL_OUTOF10: 0-NO PAIN

## 2024-12-09 NOTE — PROGRESS NOTES
Subjective   Patient ID: Geena Sánchez is a 64 y.o. female who presents for Follow-up.     Follow-up visit.  She has hypertension, lung carcinoid tumors causing her chronic cough, BMI 38, high LDL.   Received influenza vaccine today.  Has had recent bone density scan shows osteopenia.         Review of Systems   Respiratory:  Positive for cough.    All other systems reviewed and are negative.      Objective   /70 (BP Location: Left arm, Patient Position: Sitting)   Pulse 81   Resp 16   Wt 88.5 kg (195 lb 1.7 oz)   SpO2 94%   BMI 38.10 kg/m²     Physical Exam  Constitutional:       Appearance: Normal appearance. She is obese.   HENT:      Head: Normocephalic and atraumatic.      Right Ear: External ear normal.      Left Ear: External ear normal.      Mouth/Throat:      Mouth: Mucous membranes are moist.      Pharynx: Oropharynx is clear.   Neck:      Vascular: No carotid bruit.   Cardiovascular:      Rate and Rhythm: Normal rate and regular rhythm.      Heart sounds: No murmur heard.     No gallop.   Pulmonary:      Effort: Pulmonary effort is normal. No respiratory distress.      Breath sounds: No wheezing or rales.   Abdominal:      General: Abdomen is flat.      Palpations: Abdomen is soft.      Hernia: No hernia is present.   Musculoskeletal:         General: No swelling, tenderness, deformity or signs of injury.      Cervical back: No rigidity or tenderness.      Right lower leg: No edema.   Lymphadenopathy:      Cervical: No cervical adenopathy.   Skin:     Coloration: Skin is not jaundiced or pale.      Findings: No bruising, erythema, lesion or rash.   Neurological:      General: No focal deficit present.      Mental Status: She is oriented to person, place, and time.      Motor: No weakness.      Coordination: Coordination normal.   Psychiatric:         Mood and Affect: Mood normal.         Behavior: Behavior normal.         Assessment/Plan   Problem List Items Addressed This Visit              ICD-10-CM    Essential hypertension, benign - Primary I10     Hypertension : controlled blood pressure and continues same medications and educate a low salt diet do not exceed 200 mg per serving. Keep monitor the blood pressure at home.            High serum low-density lipoprotein (LDL) R79.89     Hypercholesterolemia: reviewed lipid profile.   Educate low cholesterol diet , avoid fast foods , processed meats and fried foods, red meat.  Increase physical activity.  Keep a low carb diet.               Obesity E66.9     BMI 38,  Discussed weight loss follow low-calorie diet avoid rice potatoes pasta sweet sweet drinks.  Increase physical activity is much as possible.         Need for immunization against influenza Z23    Relevant Orders    Flu vaccine, trivalent, preservative free, age 6 months and greater (Fluarix/Fluzone/Flulaval) (Completed)

## 2024-12-09 NOTE — ASSESSMENT & PLAN NOTE
BMI 38,  Discussed weight loss follow low-calorie diet avoid rice potatoes pasta sweet sweet drinks.  Increase physical activity is much as possible.

## 2024-12-09 NOTE — ASSESSMENT & PLAN NOTE
Hypercholesterolemia: reviewed lipid profile.   Educate low cholesterol diet , avoid fast foods , processed meats and fried foods, red meat.  Increase physical activity.  Keep a low carb diet.

## 2024-12-17 DIAGNOSIS — I10 ESSENTIAL HYPERTENSION, BENIGN: ICD-10-CM

## 2024-12-18 PROCEDURE — RXMED WILLOW AMBULATORY MEDICATION CHARGE

## 2024-12-18 RX ORDER — LOSARTAN POTASSIUM AND HYDROCHLOROTHIAZIDE 12.5; 1 MG/1; MG/1
1 TABLET ORAL DAILY
Qty: 90 TABLET | Refills: 1 | Status: SHIPPED | OUTPATIENT
Start: 2024-12-18 | End: 2025-06-16

## 2024-12-20 ENCOUNTER — PHARMACY VISIT (OUTPATIENT)
Dept: PHARMACY | Facility: CLINIC | Age: 64
End: 2024-12-20
Payer: COMMERCIAL

## 2025-01-21 ENCOUNTER — APPOINTMENT (OUTPATIENT)
Dept: PRIMARY CARE | Facility: CLINIC | Age: 65
End: 2025-01-21
Payer: COMMERCIAL

## 2025-02-05 DIAGNOSIS — Z12.11 ENCOUNTER FOR SCREENING COLONOSCOPY: Primary | ICD-10-CM

## 2025-03-13 PROCEDURE — RXMED WILLOW AMBULATORY MEDICATION CHARGE

## 2025-03-19 ENCOUNTER — PHARMACY VISIT (OUTPATIENT)
Dept: PHARMACY | Facility: CLINIC | Age: 65
End: 2025-03-19
Payer: COMMERCIAL

## 2025-04-23 ENCOUNTER — HOSPITAL ENCOUNTER (OUTPATIENT)
Dept: GASTROENTEROLOGY | Facility: HOSPITAL | Age: 65
Discharge: HOME | End: 2025-04-23
Payer: COMMERCIAL

## 2025-04-23 VITALS
SYSTOLIC BLOOD PRESSURE: 104 MMHG | HEART RATE: 75 BPM | OXYGEN SATURATION: 98 % | DIASTOLIC BLOOD PRESSURE: 62 MMHG | RESPIRATION RATE: 20 BRPM

## 2025-04-23 DIAGNOSIS — Z12.11 ENCOUNTER FOR SCREENING COLONOSCOPY: ICD-10-CM

## 2025-04-23 PROCEDURE — 99153 MOD SED SAME PHYS/QHP EA: CPT | Performed by: INTERNAL MEDICINE

## 2025-04-23 PROCEDURE — 99152 MOD SED SAME PHYS/QHP 5/>YRS: CPT | Performed by: INTERNAL MEDICINE

## 2025-04-23 PROCEDURE — 45385 COLONOSCOPY W/LESION REMOVAL: CPT | Performed by: INTERNAL MEDICINE

## 2025-04-23 PROCEDURE — 7100000010 HC PHASE TWO TIME - EACH INCREMENTAL 1 MINUTE

## 2025-04-23 PROCEDURE — 7100000009 HC PHASE TWO TIME - INITIAL BASE CHARGE

## 2025-04-23 PROCEDURE — 3700000012 HC SEDATION LEVEL 5+ TIME - INITIAL 15 MINUTES 5/> YEARS

## 2025-04-23 PROCEDURE — 3700000013 HC SEDATION LEVEL 5+ TIME - EACH ADDITIONAL 15 MINUTES

## 2025-04-23 PROCEDURE — 2500000004 HC RX 250 GENERAL PHARMACY W/ HCPCS (ALT 636 FOR OP/ED): Performed by: INTERNAL MEDICINE

## 2025-04-23 RX ORDER — MIDAZOLAM HYDROCHLORIDE 1 MG/ML
INJECTION, SOLUTION INTRAMUSCULAR; INTRAVENOUS AS NEEDED
Status: COMPLETED | OUTPATIENT
Start: 2025-04-23 | End: 2025-04-23

## 2025-04-23 RX ORDER — FENTANYL CITRATE 50 UG/ML
INJECTION, SOLUTION INTRAMUSCULAR; INTRAVENOUS AS NEEDED
Status: COMPLETED | OUTPATIENT
Start: 2025-04-23 | End: 2025-04-23

## 2025-04-23 RX ADMIN — FENTANYL CITRATE 25 MCG: 50 INJECTION, SOLUTION INTRAMUSCULAR; INTRAVENOUS at 08:21

## 2025-04-23 RX ADMIN — MIDAZOLAM 1 MG: 1 INJECTION INTRAMUSCULAR; INTRAVENOUS at 08:21

## 2025-04-23 RX ADMIN — MIDAZOLAM 2 MG: 1 INJECTION INTRAMUSCULAR; INTRAVENOUS at 08:15

## 2025-04-23 RX ADMIN — FENTANYL CITRATE 50 MCG: 50 INJECTION, SOLUTION INTRAMUSCULAR; INTRAVENOUS at 08:15

## 2025-04-23 ASSESSMENT — PAIN SCALES - GENERAL
PAINLEVEL_OUTOF10: 3
PAINLEVEL_OUTOF10: 0 - NO PAIN

## 2025-04-23 ASSESSMENT — PAIN - FUNCTIONAL ASSESSMENT
PAIN_FUNCTIONAL_ASSESSMENT: 0-10

## 2025-04-23 NOTE — H&P
History Of Present Illness  Geena Sánchez is a 65 y.o. female presenting with h/o polyps.     Past Medical History  Medical History[1]  Surgical History  Surgical History[2]  Social History  She reports that she has never smoked. She has never used smokeless tobacco. She reports current alcohol use. She reports that she does not use drugs.    Family History  Family History[3]     Allergies  Allergies[4]  Review of Systems   All other systems reviewed and are negative.       Physical Exam  Pulmonary:      Effort: Pulmonary effort is normal.   Abdominal:      Palpations: Abdomen is soft.      Tenderness: There is no abdominal tenderness.          Last Recorded Vitals  There were no vitals taken for this visit.    Assessment/Plan   Surveillance colonoscopy     Lubna Lomeli MD       [1]   Past Medical History:  Diagnosis Date    Arthritis     Benign paroxysmal vertigo, unspecified ear     Benign paroxysmal positional vertigo    Breast cancer     Chronic cough 07/02/2015    Chronic cough    Eczema     Hypertension     Other conditions influencing health status     Breast Cancer    Pain in right elbow 05/27/2014    Right elbow pain    Pain in right elbow 04/16/2014    Right elbow pain    Personal history of diseases of the skin and subcutaneous tissue 03/31/2014    History of abscess of skin and subcutaneous tissue    Personal history of irradiation     Personal history of Methicillin resistant Staphylococcus aureus infection 04/10/2014    History of methicillin resistant Staphylococcus aureus infection    Personal history of other diseases of the respiratory system 02/24/2015    History of asthma    Personal history of other diseases of the respiratory system 12/04/2014    History of acute bronchitis    Personal history of other infectious and parasitic diseases 03/31/2014    History of tinea corporis    Personal history of other specified conditions 02/12/2015    History of shortness of breath    Personal history  of other specified conditions 05/19/2014    History of edema    Personal history of pneumonia (recurrent)     History of bacterial pneumonia    Postmenopausal atrophic vaginitis 12/11/2017    Vaginal atrophy    Postnasal drip 07/02/2015    Postnasal drip    Postnasal drip 02/23/2015    Post-nasal drainage    Rash and other nonspecific skin eruption 07/14/2014    Rash    Unspecified fracture of lower end of right humerus, initial encounter for closed fracture 05/19/2014    Elbow fracture, right   [2]   Past Surgical History:  Procedure Laterality Date    BREAST LUMPECTOMY Right 03/13/2013    Breast Surgery Lumpectomy with radiation    CHOLECYSTECTOMY  03/13/2013    Cholecystectomy    LYMPH NODE BIOPSY  2008    OTHER SURGICAL HISTORY  03/13/2013    Tonsillectomy Over Age 12    OTHER SURGICAL HISTORY  03/13/2013    Dilation Of Female Urethra    TONSILLECTOMY     [3]   Family History  Problem Relation Name Age of Onset    Hyperlipidemia Mother Dariuselias Princess     Breast cancer Mother Toño Sánchez     Atrial fibrillation Mother Toño Sánchez     Other (mitral valve paravalvular leak repair) Mother Toño Sánchez     Other (thyroid surgery heydi thyroidectomy) Mother Toño Sánchez     Hypertension Father Elliott Sánchez     Other (non hodgkin's lymphoma) Father Elliott Sánchez     Arthritis Father Elliott Sánchez     Atrial fibrillation Father Elliott Sánchez     Heart disease Father Elliott Sánchez     Other (ischemic stroke) Maternal Grandmother      Prostate cancer Maternal Grandfather      Lung cancer Paternal Grandfather      Diabetes Sister Belinda Diallo Chai    [4]   Allergies  Allergen Reactions    Bee Pollen Unknown    Lisinopril Other

## 2025-05-01 LAB
LABORATORY COMMENT REPORT: NORMAL
PATH REPORT.FINAL DX SPEC: NORMAL
PATH REPORT.GROSS SPEC: NORMAL
PATH REPORT.TOTAL CANCER: NORMAL

## 2025-05-14 ENCOUNTER — HOSPITAL ENCOUNTER (OUTPATIENT)
Dept: RADIOLOGY | Facility: HOSPITAL | Age: 65
Discharge: HOME | End: 2025-05-14
Payer: COMMERCIAL

## 2025-05-14 DIAGNOSIS — D3A.8 NEUROENDOCRINE NEOPLASM OF LUNG: ICD-10-CM

## 2025-05-14 PROCEDURE — 71250 CT THORAX DX C-: CPT | Performed by: RADIOLOGY

## 2025-05-14 PROCEDURE — 71250 CT THORAX DX C-: CPT

## 2025-05-19 ENCOUNTER — OFFICE VISIT (OUTPATIENT)
Dept: HEMATOLOGY/ONCOLOGY | Facility: HOSPITAL | Age: 65
End: 2025-05-19
Payer: COMMERCIAL

## 2025-05-19 VITALS
TEMPERATURE: 97.9 F | WEIGHT: 191.8 LBS | SYSTOLIC BLOOD PRESSURE: 149 MMHG | BODY MASS INDEX: 38.67 KG/M2 | HEIGHT: 59 IN | HEART RATE: 73 BPM | RESPIRATION RATE: 18 BRPM | OXYGEN SATURATION: 95 % | DIASTOLIC BLOOD PRESSURE: 72 MMHG

## 2025-05-19 DIAGNOSIS — D3A.8 NEUROENDOCRINE NEOPLASM OF LUNG: ICD-10-CM

## 2025-05-19 PROCEDURE — 1126F AMNT PAIN NOTED NONE PRSNT: CPT | Performed by: INTERNAL MEDICINE

## 2025-05-19 PROCEDURE — 1159F MED LIST DOCD IN RCRD: CPT | Performed by: INTERNAL MEDICINE

## 2025-05-19 PROCEDURE — 3077F SYST BP >= 140 MM HG: CPT | Performed by: INTERNAL MEDICINE

## 2025-05-19 PROCEDURE — 99214 OFFICE O/P EST MOD 30 MIN: CPT | Performed by: INTERNAL MEDICINE

## 2025-05-19 PROCEDURE — 1036F TOBACCO NON-USER: CPT | Performed by: INTERNAL MEDICINE

## 2025-05-19 PROCEDURE — 3008F BODY MASS INDEX DOCD: CPT | Performed by: INTERNAL MEDICINE

## 2025-05-19 PROCEDURE — 1123F ACP DISCUSS/DSCN MKR DOCD: CPT | Performed by: INTERNAL MEDICINE

## 2025-05-19 PROCEDURE — 3078F DIAST BP <80 MM HG: CPT | Performed by: INTERNAL MEDICINE

## 2025-05-19 ASSESSMENT — ENCOUNTER SYMPTOMS
CHANGE IN BOWEL HABIT: 0
JOINT SWELLING: 0
SORE THROAT: 0
SWOLLEN GLANDS: 0
FATIGUE: 0
NECK PAIN: 0
VISUAL CHANGE: 0
NAUSEA: 0
MYALGIAS: 0
ABDOMINAL PAIN: 0
DIAPHORESIS: 0
VERTIGO: 0
FEVER: 0
VOMITING: 0
HEADACHES: 0
COUGH: 1
ANOREXIA: 0
CHILLS: 0
ARTHRALGIAS: 0
NUMBNESS: 0
WEAKNESS: 0

## 2025-05-19 ASSESSMENT — PAIN SCALES - GENERAL: PAINLEVEL_OUTOF10: 0-NO PAIN

## 2025-05-19 NOTE — PATIENT INSTRUCTIONS
"Orders placed today\"    -CT chest ordered  -Follow up appointment to see Dr. Amanda around 11/19/25    If you have any questions, please call us at [phone].  "

## 2025-05-19 NOTE — PROGRESS NOTES
Patient ID: Geena Sánchez is a 65 y.o. female.    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  Date of diagnosis is January 31, 2023 from a transbronchial biopsy of the lingula.  Immunohistochemistry positive for INSM1, synaptophysin, chromogranin and TTF-1  and negative for p40 GATA3 and ER.  Ki-67 is 10%.  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 - LOF  TMB 2.6 m/MB  MSI stable        SERUM TUMOR MARKER     Not yet performed        PRIOR THERAPY     1-none        CURRENT THERAPY     1-Patient has opted for observation April 2023  2- Given positive PET/NET imaging consideration to treatment with frontline octreotide in future  3-consideration for SBRT lesion to solitary growing lesion in the future        CURRENT ONCOLOGICAL PROBLEMS     1-chronic cough and asthma  2-  Rule out hereditary syndromes given multiple cancers within the family around the same age - NGS shows APC alterations, seen by Genetics Medicine, no familial syndrome identified  3- Pneumonia Nov 2023        HISTORY OF PRESENT ILLNESS     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  She states that she has been coughing for many years and almost decades.  This gets worse during the fall and winter.  She has used inhalers but nothing seems to have worked in the past.  It affects her sleeping.   Most recently she was started on Singulair and it has worked.  She also notes a history of pneumonia 1997.  Somewhere around 2022 her cough got worse, there was no flushing no diarrhea she does have some mild stress  incontinence with her cough.  In January 2022 she had a chest x-ray showing some nodules.  In March 2022 she had a CT scan showing bilateral pulmonary nodules up to slightly greater than 1 cm.  She had a PET scan on March 18, 2022 showing only mild uptake.  She had a repeat CT scan in May 2022  showing stability of disease as well as an September 2022.  But by December 2020 to one of the lesions had slightly grown and it was at that time and decided to proceed with a bronchoscopy that was performed on January 31, 2023 showing a well differentiated  neuroendocrine tumor.        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.  She lives in [place] with her sister and brother and mother.  Her mother has dementia.  She has never smoked.  She is not .  She does not have children.        CURRENT MEDS     See medication list, meds reviewed        ALLERGIES     Lisinopril and pollen        FAMILY HISTORY     Patient's herself had breast cancer in the past at the age of 48.  Her mother had breast cancer at the age of 61 her father had not Hodgkin's lymphoma at the age of 62.         Subjective    Patient reports feeling well overall. She denies any new symptoms. She has a chronic cough that is worse in the winter but is unchanged overall. She has mild ongoing fatigue that is unchanged. Otherwise denies any new unintentional weight loss, lymphadenopathy, pain, dyspnea, hemoptysis, nausea, vomiting.     Cancer  Associated symptoms include coughing. Pertinent negatives include no  "abdominal pain, anorexia, arthralgias, change in bowel habit, chest pain, chills, congestion, diaphoresis, fatigue, fever, headaches, joint swelling, myalgias, nausea, neck pain, numbness, rash, sore throat, swollen glands, urinary symptoms, vertigo, visual change, vomiting or weakness.         Objective    BSA: 1.91 meters squared  /72 (BP Location: Left arm, Patient Position: Sitting, BP Cuff Size: Adult)   Pulse 73   Temp 36.6 °C (97.9 °F) (Temporal)   Resp 18   Ht 1.508 m (4' 11.37\")   Wt 87 kg (191 lb 12.8 oz)   SpO2 95%   BMI 38.26 kg/m²      Physical Exam  Constitutional:       General: She is not in acute distress.     Appearance: Normal appearance. She is not ill-appearing, toxic-appearing or diaphoretic.   HENT:      Nose: No congestion or rhinorrhea.      Mouth/Throat:      Pharynx: No oropharyngeal exudate or posterior oropharyngeal erythema.   Eyes:      General: No scleral icterus.     Conjunctiva/sclera: Conjunctivae normal.   Cardiovascular:      Rate and Rhythm: Normal rate and regular rhythm.      Pulses: Normal pulses.      Heart sounds: Normal heart sounds. No murmur heard.     No friction rub. No gallop.   Pulmonary:      Effort: Pulmonary effort is normal. No respiratory distress.      Breath sounds: Normal breath sounds. No stridor. No wheezing, rhonchi or rales.   Chest:      Chest wall: No tenderness.   Abdominal:      General: Abdomen is flat. Bowel sounds are normal. There is no distension.      Palpations: Abdomen is soft. There is no mass.      Tenderness: There is no abdominal tenderness. There is no guarding or rebound.   Musculoskeletal:      Cervical back: No tenderness.      Right lower leg: No edema.      Left lower leg: No edema.   Lymphadenopathy:      Cervical: No cervical adenopathy.   Neurological:      General: No focal deficit present.      Mental Status: She is oriented to person, place, and time.   Psychiatric:         Mood and Affect: Mood normal. "         Performance Status:  Asymptomatic      CT CHEST WO IV CONTRAST; 5/14/2025   IMPRESSION:  1. There is no significant interval change in size or number of  multiple parenchymal metastatic lesions consistent with the patient's  history of metastatic neuroendocrine tumors.      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  Her CT scan done in follow-up shows no evidence of progression.  When I compare her imaging to those of March 2022 a lesion within the right upper lobe has grown by only 1 to 2 mm.  Otherwise everything has remained relatively stable.  Based on this we will see her back in 6 months time with repeat imaging studies. Will plan imaging every 6 months until she reaches 5 years (~3/2027), then annually thereafter.     Patient seen and discussed with Dr. Amanda.     Wili Vang MD  Hematology-Oncology Fellow, PGY5

## 2025-05-21 ENCOUNTER — APPOINTMENT (OUTPATIENT)
Dept: PRIMARY CARE | Facility: CLINIC | Age: 65
End: 2025-05-21
Payer: COMMERCIAL

## 2025-06-10 ENCOUNTER — APPOINTMENT (OUTPATIENT)
Dept: PRIMARY CARE | Facility: CLINIC | Age: 65
End: 2025-06-10
Payer: COMMERCIAL

## 2025-06-17 ENCOUNTER — PHARMACY VISIT (OUTPATIENT)
Dept: PHARMACY | Facility: CLINIC | Age: 65
End: 2025-06-17
Payer: COMMERCIAL

## 2025-06-17 DIAGNOSIS — I10 ESSENTIAL HYPERTENSION, BENIGN: ICD-10-CM

## 2025-06-17 PROCEDURE — RXMED WILLOW AMBULATORY MEDICATION CHARGE

## 2025-06-17 RX ORDER — LOSARTAN POTASSIUM AND HYDROCHLOROTHIAZIDE 12.5; 1 MG/1; MG/1
1 TABLET ORAL DAILY
Qty: 90 TABLET | Refills: 0 | Status: SHIPPED | OUTPATIENT
Start: 2025-06-17 | End: 2025-09-15